# Patient Record
Sex: FEMALE | URBAN - METROPOLITAN AREA
[De-identification: names, ages, dates, MRNs, and addresses within clinical notes are randomized per-mention and may not be internally consistent; named-entity substitution may affect disease eponyms.]

---

## 2024-06-08 ENCOUNTER — INPATIENT (INPATIENT)
Facility: HOSPITAL | Age: 79
LOS: 4 days | Discharge: PSYCHIATRIC OR SUBSTANCE USE FACILITY | DRG: 312 | End: 2024-06-13
Attending: INTERNAL MEDICINE | Admitting: STUDENT IN AN ORGANIZED HEALTH CARE EDUCATION/TRAINING PROGRAM
Payer: MEDICARE

## 2024-06-08 VITALS
RESPIRATION RATE: 20 BRPM | HEART RATE: 82 BPM | DIASTOLIC BLOOD PRESSURE: 75 MMHG | OXYGEN SATURATION: 98 % | SYSTOLIC BLOOD PRESSURE: 124 MMHG | HEIGHT: 70 IN | WEIGHT: 186.07 LBS | TEMPERATURE: 98 F

## 2024-06-08 DIAGNOSIS — R55 SYNCOPE AND COLLAPSE: ICD-10-CM

## 2024-06-08 LAB
ALBUMIN SERPL ELPH-MCNC: 3.1 G/DL — LOW (ref 3.3–5.2)
ALP SERPL-CCNC: 48 U/L — SIGNIFICANT CHANGE UP (ref 40–120)
ALT FLD-CCNC: 6 U/L — SIGNIFICANT CHANGE UP
ANION GAP SERPL CALC-SCNC: 10 MMOL/L — SIGNIFICANT CHANGE UP (ref 5–17)
AST SERPL-CCNC: 15 U/L — SIGNIFICANT CHANGE UP
BASOPHILS # BLD AUTO: 0.03 K/UL — SIGNIFICANT CHANGE UP (ref 0–0.2)
BASOPHILS NFR BLD AUTO: 0.3 % — SIGNIFICANT CHANGE UP (ref 0–2)
BILIRUB SERPL-MCNC: 0.4 MG/DL — SIGNIFICANT CHANGE UP (ref 0.4–2)
BUN SERPL-MCNC: 54.7 MG/DL — HIGH (ref 8–20)
CALCIUM SERPL-MCNC: 8.7 MG/DL — SIGNIFICANT CHANGE UP (ref 8.4–10.5)
CHLORIDE SERPL-SCNC: 104 MMOL/L — SIGNIFICANT CHANGE UP (ref 96–108)
CO2 SERPL-SCNC: 24 MMOL/L — SIGNIFICANT CHANGE UP (ref 22–29)
CREAT SERPL-MCNC: 0.75 MG/DL — SIGNIFICANT CHANGE UP (ref 0.5–1.3)
EGFR: 82 ML/MIN/1.73M2 — SIGNIFICANT CHANGE UP
EOSINOPHIL # BLD AUTO: 0.01 K/UL — SIGNIFICANT CHANGE UP (ref 0–0.5)
EOSINOPHIL NFR BLD AUTO: 0.1 % — SIGNIFICANT CHANGE UP (ref 0–6)
GLUCOSE SERPL-MCNC: 110 MG/DL — HIGH (ref 70–99)
HCT VFR BLD CALC: 23.2 % — LOW (ref 34.5–45)
HCT VFR BLD CALC: 24.7 % — LOW (ref 34.5–45)
HGB BLD-MCNC: 7.9 G/DL — LOW (ref 11.5–15.5)
HGB BLD-MCNC: 8.2 G/DL — LOW (ref 11.5–15.5)
IMM GRANULOCYTES NFR BLD AUTO: 0.6 % — SIGNIFICANT CHANGE UP (ref 0–0.9)
LYMPHOCYTES # BLD AUTO: 0.78 K/UL — LOW (ref 1–3.3)
LYMPHOCYTES # BLD AUTO: 6.5 % — LOW (ref 13–44)
MCHC RBC-ENTMCNC: 31.5 PG — SIGNIFICANT CHANGE UP (ref 27–34)
MCHC RBC-ENTMCNC: 32 PG — SIGNIFICANT CHANGE UP (ref 27–34)
MCHC RBC-ENTMCNC: 33.2 GM/DL — SIGNIFICANT CHANGE UP (ref 32–36)
MCHC RBC-ENTMCNC: 34.1 GM/DL — SIGNIFICANT CHANGE UP (ref 32–36)
MCV RBC AUTO: 93.9 FL — SIGNIFICANT CHANGE UP (ref 80–100)
MCV RBC AUTO: 95 FL — SIGNIFICANT CHANGE UP (ref 80–100)
MONOCYTES # BLD AUTO: 0.94 K/UL — HIGH (ref 0–0.9)
MONOCYTES NFR BLD AUTO: 7.9 % — SIGNIFICANT CHANGE UP (ref 2–14)
NEUTROPHILS # BLD AUTO: 10.12 K/UL — HIGH (ref 1.8–7.4)
NEUTROPHILS NFR BLD AUTO: 84.6 % — HIGH (ref 43–77)
PLATELET # BLD AUTO: 232 K/UL — SIGNIFICANT CHANGE UP (ref 150–400)
PLATELET # BLD AUTO: 239 K/UL — SIGNIFICANT CHANGE UP (ref 150–400)
POTASSIUM SERPL-MCNC: 4 MMOL/L — SIGNIFICANT CHANGE UP (ref 3.5–5.3)
POTASSIUM SERPL-SCNC: 4 MMOL/L — SIGNIFICANT CHANGE UP (ref 3.5–5.3)
PROT SERPL-MCNC: 5.4 G/DL — LOW (ref 6.6–8.7)
RBC # BLD: 2.47 M/UL — LOW (ref 3.8–5.2)
RBC # BLD: 2.6 M/UL — LOW (ref 3.8–5.2)
RBC # FLD: 14 % — SIGNIFICANT CHANGE UP (ref 10.3–14.5)
RBC # FLD: 14.1 % — SIGNIFICANT CHANGE UP (ref 10.3–14.5)
SODIUM SERPL-SCNC: 138 MMOL/L — SIGNIFICANT CHANGE UP (ref 135–145)
TROPONIN T, HIGH SENSITIVITY RESULT: 10 NG/L — SIGNIFICANT CHANGE UP (ref 0–51)
TROPONIN T, HIGH SENSITIVITY RESULT: 11 NG/L — SIGNIFICANT CHANGE UP (ref 0–51)
WBC # BLD: 11.95 K/UL — HIGH (ref 3.8–10.5)
WBC # BLD: 8.81 K/UL — SIGNIFICANT CHANGE UP (ref 3.8–10.5)
WBC # FLD AUTO: 11.95 K/UL — HIGH (ref 3.8–10.5)
WBC # FLD AUTO: 8.81 K/UL — SIGNIFICANT CHANGE UP (ref 3.8–10.5)

## 2024-06-08 PROCEDURE — 70450 CT HEAD/BRAIN W/O DYE: CPT | Mod: 26,MC

## 2024-06-08 PROCEDURE — 72125 CT NECK SPINE W/O DYE: CPT | Mod: 26,MC

## 2024-06-08 PROCEDURE — 71045 X-RAY EXAM CHEST 1 VIEW: CPT | Mod: 26

## 2024-06-08 PROCEDURE — 93010 ELECTROCARDIOGRAM REPORT: CPT

## 2024-06-08 PROCEDURE — 99223 1ST HOSP IP/OBS HIGH 75: CPT | Mod: FS

## 2024-06-08 RX ORDER — APIXABAN 2.5 MG/1
5 TABLET, FILM COATED ORAL
Refills: 0 | Status: DISCONTINUED | OUTPATIENT
Start: 2024-06-08 | End: 2024-06-09

## 2024-06-08 RX ORDER — SODIUM CHLORIDE 9 MG/ML
1000 INJECTION INTRAMUSCULAR; INTRAVENOUS; SUBCUTANEOUS ONCE
Refills: 0 | Status: COMPLETED | OUTPATIENT
Start: 2024-06-08 | End: 2024-06-08

## 2024-06-08 RX ADMIN — SODIUM CHLORIDE 1000 MILLILITER(S): 9 INJECTION INTRAMUSCULAR; INTRAVENOUS; SUBCUTANEOUS at 19:30

## 2024-06-08 RX ADMIN — APIXABAN 5 MILLIGRAM(S): 2.5 TABLET, FILM COATED ORAL at 17:40

## 2024-06-08 NOTE — CONSULT NOTE ADULT - PROBLEM SELECTOR RECOMMENDATION 9
- pt w/ near syncopal episode x 2, once two months ago, once yesterday evening. This AM had mechanical fall without LOC   - Cardiology consulted to eval etiology of near syncope   - pt lives at [facility]   - she has neutrophilic leukocytosis concerning for infection   - BUN is also very high 54.7 with normal creatinine, ratio is concerning for dehydration   - pt is also anemic, unknown baseline. consider r/o GIB. pt is on eliquis. check occult blood and hold eliquis if bleeding/consult GI   - trop is negative x 2   - EKG is non ischemic   - would r/o underlying infection consider UTI/Viral illness as part of differential   - would give IVF hydration, trend CBC and consider blood transfusion if necessary  - check Echo and monitor on telemetry   - check orthostatic   - Patient's presentation does not appear to be of primary cardiac etiology, we will follow but she needs infectious disease / GIB workup. Strongly recommend hospitalist evaluation. strongly recommend workup to rule out other etiologies and explain leukocytosis/anemia/marked elevated BUN - pt w/ near syncopal episode x 2, once two months ago, once yesterday evening. This AM had mechanical fall without LOC   - Cardiology consulted to eval etiology of near syncope   - pt lives at [facility]   - she has neutrophilic leukocytosis concerning for infection   - BUN is also very high 54.7 with normal creatinine, ratio is concerning for dehydration   - pt is also anemic, unknown baseline. consider r/o GIB. pt is on eliquis. check occult blood and hold eliquis if bleeding/consult GI   - trop is negative x 2   - EKG shows lateral TWI consistent w/ lateral ischemia. no EKG for comparison. pt categorically denies angina/anginal equivalents. No ST elevation or depression.   - would r/o underlying infection consider UTI/Viral illness as part of differential   - would give IVF hydration, trend CBC and consider blood transfusion if necessary  - check Echo and monitor on telemetry   - check orthostatic   - Patient's presentation does not appear to be of primary cardiac etiology, we will follow but she needs infectious disease / GIB workup. Strongly recommend hospitalist evaluation. strongly recommend workup to rule out other etiologies and explain leukocytosis/anemia/marked elevated BUN

## 2024-06-08 NOTE — ED PROVIDER NOTE - CLINICAL SUMMARY MEDICAL DECISION MAKING FREE TEXT BOX
78 year old female on eliquis s/p near syncopal fall. On examination patient is alert and oriented. Answering questions appropriately. HD stable. Not complaining of any pain. Examination benign. CT head/neck ordered to assess for any possible ICH given patient's AC use. TTE and labs ordered given recent multiple fall/near syncopal episodes to assess for possible cardiac/metabolic etiology.

## 2024-06-08 NOTE — ED PROVIDER NOTE - PROGRESS NOTE DETAILS
Patient to be placed in obs pending an echo in the AM as well as cardiology assessment. Spoke with patient regarding need to stay, patient understands and in agreement with plan.   Concettar Jacque Escobedo MD

## 2024-06-08 NOTE — ED CDU PROVIDER INITIAL DAY NOTE - OBJECTIVE STATEMENT
79yo F PMHx PE on eliquis BIBEMS from [facility] s/p syncope. Patient states she experienced an episode of lightheadedness yesterday while sitting that quickly self resolved. However today, felt lightheaded upon standing while getting out of the bathroom, felt unsteady and fell backwards. Denies LOC. Able to ambulate shortly afterwards without assistance. Reports she may have had similar episode 1 week ago, otherwise no hx of syncope previously. Does not follow with cardiology. ED workup reviewed, hemoglobin 8.2 without previous labs for comparison. Pt denies any known hx anemia. Labs otherwise unremarkable, trop stable (10, 11). CT head and c-spine, CXR negative. Pt feeling well at this time, no complaints currently. Placed in obs for TTE and cardiology c/s. Denies headache, cp, sob, n/v/d, melena, urinary sx, neck pain, back pain, numbness, tingling, weakness.

## 2024-06-08 NOTE — ED CDU PROVIDER INITIAL DAY NOTE - CLINICAL SUMMARY MEDICAL DECISION MAKING FREE TEXT BOX
79yo F PMHx PE on eliquis BIBEMS from [facility] s/p syncope. near syncope yesterday and syncope upon standing today. neurologically intact. Troponin stable. Anemic Hgb 8.2 without previous for comparison. hemodynamically stable. will repeat cbc. Obs for TTE and [facility] c/s, tele monitoring

## 2024-06-08 NOTE — ED ADULT NURSE REASSESSMENT NOTE - NSFALLHARMRISKINTERV_ED_ALL_ED
Assistance OOB with selected safe patient handling equipment if applicable/Assistance with ambulation/Communicate risk of Fall with Harm to all staff, patient, and family/Monitor gait and stability/Monitor for mental status changes and reorient to person, place, and time, as needed/Move patient closer to nursing station/within visual sight of ED staff/Provide visual cue: red socks, yellow wristband, yellow gown, etc/Reinforce activity limits and safety measures with patient and family/Toileting schedule using arm’s reach rule for commode and bathroom/Use of alarms - bed, stretcher, chair and/or video monitoring/Bed in lowest position, wheels locked, appropriate side rails in place/Call bell, personal items and telephone in reach/Instruct patient to call for assistance before getting out of bed/chair/stretcher/Non-slip footwear applied when patient is off stretcher/[place] to call system/Physically safe environment - no spills, clutter or unnecessary equipment/Purposeful Proactive Rounding/Room/bathroom lighting operational, light cord in reach

## 2024-06-08 NOTE — ED PROVIDER NOTE - OBJECTIVE STATEMENT
78 year old female w/PMHx PE on eliquis B[facility]EMS [facility]/p fall. Patient states she experienced an episode of lightheadedness yesterday while sitting that quickly self resolved. However today, felt lightheaded while getting out of the bathroom, felt unsteady and fell backwards. Denies LOC. Able to ambulate shortly afterwards without assistance. Notes lightheadedness episode was associated with dizziness but denies any chest pain, neck pain, SOB, vision changes, headache, numbness or tingling. Denies any recent fevers, chills, nausea, vomiting or abd pain. No recent medication changes.

## 2024-06-08 NOTE — ED ADULT NURSE NOTE - OBJECTIVE STATEMENT
Pt A+ox4 states that she got dizzy when she stood up and fell .Pt states that she is unsure if she hit her head. Pt c/o leg pain at this time, pt states it feels like a cramp. Pt denies CP, SOB, ABD pain, HA, N/V/D. Pupils are round, equal and reactive to light. Pt connected to telebox at this time. [facility] Pt left in position of comfort, wheels of stretcher locked and in the lowest position.

## 2024-06-08 NOTE — ED ADULT NURSE NOTE - NSFALLRISKFACTORS_ED_ALL_ED
Coagulation: Bleeding disorder either through use of anticoagulants or underlying clinical condition(s) Cyclophosphamide Counseling:  I discussed with the patient the risks of cyclophosphamide including but not limited to hair loss, hormonal abnormalities, decreased fertility, abdominal pain, diarrhea, nausea and vomiting, bone marrow suppression and infection. The patient understands that monitoring is required while taking this medication.

## 2024-06-08 NOTE — ED ADULT NURSE NOTE - NSFALLHARMRISKINTERV_ED_ALL_ED
Assistance OOB with selected safe patient handling equipment if applicable/Assistance with ambulation/Communicate risk of Fall with Harm to all staff, patient, and family/Encourage patient to sit up slowly, dangle for a short time, stand at bedside before walking/Monitor gait and stability/Orthostatic vital signs/Provide visual cue: red socks, yellow wristband, yellow gown, etc/Reinforce activity limits and safety measures with patient and family/Bed in lowest position, wheels locked, appropriate side rails in place/Call bell, personal items and telephone in reach/Instruct patient to call for assistance before getting out of bed/chair/stretcher/Non-slip footwear applied when patient is off stretcher/[place] to call system/Physically safe environment - no spills, clutter or unnecessary equipment/Purposeful Proactive Rounding/Room/bathroom lighting operational, light cord in reach

## 2024-06-08 NOTE — ED PROVIDER NOTE - INTERPRETATION
Dr. Stanley: Independent review of EKG shows NSR with isolated T wave inversions in lead I and aVL without reciprocal changes, no STEMI, 88 bpm, , , QTc 471.

## 2024-06-08 NOTE — ED ADULT TRIAGE NOTE - CHIEF COMPLAINT QUOTE
" I got up out of bed and felt lightheaded and fell" pt unsure if she hit her head, denies LOC. pt on blood thinners, unsure of the name of medication. pt co pain to lower legs. pt from [facility] with [facility] at bedside.

## 2024-06-08 NOTE — ED PROVIDER NOTE - ATTENDING CONTRIBUTION TO CARE
I have personally performed a history and physical examination of the patient and discussed management with the resident as well as the patient.  I reviewed the resident's note and agree with the documented findings and plan of care.  I have authored and modified critical sections of the Provider Note, including but not limited to HPI, Physical Exam and MDM.    78 year old female w/PMHx PE on eliquis B[facility]EMS [facility] s/p fall. Patient states she experienced an episode of lightheadedness yesterday while sitting that quickly self resolved. However today, felt lightheaded while getting out of the bathroom, felt unsteady and fell backwards.  Patient not tachycardic and otherwise hemodynamically stable.  A&O X3, GCS 15 on examination.  Asymptomatic at this time.  Low suspicion ACS.  Will obtain CBC, CMP, EKG.  Consider CT PE however lower likelihood of diagnostic yield.  In the setting of syncope will obtain TTE.  Obtain CT head and CT C-spine for low suspicion fracture versus TBI.  Disposition pending results.

## 2024-06-08 NOTE — ED CDU PROVIDER INITIAL DAY NOTE - ATTENDING APP SHARED VISIT CONTRIBUTION OF CARE
I, Stanley Sanchez MD, performed the initial face to face bedside interview with this patient regarding history of present illness, review of symptoms and relevant past medical, social and family history.  I completed an independent physical examination.  I was the initial provider who evaluated this patient. I have signed out the follow up of any pending tests (i.e. labs, radiological studies) to the ACP.  I have communicated the patient’s plan of care and disposition with the ACP.

## 2024-06-09 ENCOUNTER — RESULT REVIEW (OUTPATIENT)
Age: 79
End: 2024-06-09

## 2024-06-09 DIAGNOSIS — R55 SYNCOPE AND COLLAPSE: ICD-10-CM

## 2024-06-09 LAB
ABO RH CONFIRMATION: SIGNIFICANT CHANGE UP
ALBUMIN SERPL ELPH-MCNC: 3 G/DL — LOW (ref 3.3–5.2)
ALBUMIN SERPL ELPH-MCNC: 3 G/DL — LOW (ref 3.3–5.2)
ALP SERPL-CCNC: 47 U/L — SIGNIFICANT CHANGE UP (ref 40–120)
ALP SERPL-CCNC: 50 U/L — SIGNIFICANT CHANGE UP (ref 40–120)
ALT FLD-CCNC: 7 U/L — SIGNIFICANT CHANGE UP
ALT FLD-CCNC: 9 U/L — SIGNIFICANT CHANGE UP
ANION GAP SERPL CALC-SCNC: 10 MMOL/L — SIGNIFICANT CHANGE UP (ref 5–17)
APTT BLD: 31.6 SEC — SIGNIFICANT CHANGE UP (ref 24.5–35.6)
AST SERPL-CCNC: 29 U/L — SIGNIFICANT CHANGE UP
AST SERPL-CCNC: 33 U/L — HIGH
BASOPHILS # BLD AUTO: 0.04 K/UL — SIGNIFICANT CHANGE UP (ref 0–0.2)
BASOPHILS # BLD AUTO: 0.06 K/UL — SIGNIFICANT CHANGE UP (ref 0–0.2)
BASOPHILS NFR BLD AUTO: 0.6 % — SIGNIFICANT CHANGE UP (ref 0–2)
BASOPHILS NFR BLD AUTO: 0.8 % — SIGNIFICANT CHANGE UP (ref 0–2)
BILIRUB DIRECT SERPL-MCNC: 0.2 MG/DL — SIGNIFICANT CHANGE UP (ref 0–0.3)
BILIRUB INDIRECT FLD-MCNC: 0.2 MG/DL — SIGNIFICANT CHANGE UP (ref 0.2–1)
BILIRUB SERPL-MCNC: 0.4 MG/DL — SIGNIFICANT CHANGE UP (ref 0.4–2)
BILIRUB SERPL-MCNC: 0.5 MG/DL — SIGNIFICANT CHANGE UP (ref 0.4–2)
BLD GP AB SCN SERPL QL: SIGNIFICANT CHANGE UP
BUN SERPL-MCNC: 43 MG/DL — HIGH (ref 8–20)
CALCIUM SERPL-MCNC: 8.6 MG/DL — SIGNIFICANT CHANGE UP (ref 8.4–10.5)
CHLORIDE SERPL-SCNC: 107 MMOL/L — SIGNIFICANT CHANGE UP (ref 96–108)
CO2 SERPL-SCNC: 22 MMOL/L — SIGNIFICANT CHANGE UP (ref 22–29)
CREAT SERPL-MCNC: 0.8 MG/DL — SIGNIFICANT CHANGE UP (ref 0.5–1.3)
EGFR: 76 ML/MIN/1.73M2 — SIGNIFICANT CHANGE UP
EOSINOPHIL # BLD AUTO: 0.04 K/UL — SIGNIFICANT CHANGE UP (ref 0–0.5)
EOSINOPHIL # BLD AUTO: 0.14 K/UL — SIGNIFICANT CHANGE UP (ref 0–0.5)
EOSINOPHIL NFR BLD AUTO: 0.5 % — SIGNIFICANT CHANGE UP (ref 0–6)
EOSINOPHIL NFR BLD AUTO: 2.1 % — SIGNIFICANT CHANGE UP (ref 0–6)
GLUCOSE SERPL-MCNC: 100 MG/DL — HIGH (ref 70–99)
HCT VFR BLD CALC: 20.2 % — CRITICAL LOW (ref 34.5–45)
HCT VFR BLD CALC: 20.2 % — CRITICAL LOW (ref 34.5–45)
HCT VFR BLD CALC: 21.4 % — LOW (ref 34.5–45)
HGB BLD-MCNC: 6.9 G/DL — CRITICAL LOW (ref 11.5–15.5)
HGB BLD-MCNC: 6.9 G/DL — CRITICAL LOW (ref 11.5–15.5)
HGB BLD-MCNC: 7.2 G/DL — LOW (ref 11.5–15.5)
IMM GRANULOCYTES NFR BLD AUTO: 0.4 % — SIGNIFICANT CHANGE UP (ref 0–0.9)
IMM GRANULOCYTES NFR BLD AUTO: 0.4 % — SIGNIFICANT CHANGE UP (ref 0–0.9)
INR BLD: 1.72 RATIO — HIGH (ref 0.85–1.18)
LYMPHOCYTES # BLD AUTO: 1.4 K/UL — SIGNIFICANT CHANGE UP (ref 1–3.3)
LYMPHOCYTES # BLD AUTO: 1.56 K/UL — SIGNIFICANT CHANGE UP (ref 1–3.3)
LYMPHOCYTES # BLD AUTO: 18.9 % — SIGNIFICANT CHANGE UP (ref 13–44)
LYMPHOCYTES # BLD AUTO: 23.4 % — SIGNIFICANT CHANGE UP (ref 13–44)
MANUAL SMEAR VERIFICATION: SIGNIFICANT CHANGE UP
MCHC RBC-ENTMCNC: 31.4 PG — SIGNIFICANT CHANGE UP (ref 27–34)
MCHC RBC-ENTMCNC: 32.2 PG — SIGNIFICANT CHANGE UP (ref 27–34)
MCHC RBC-ENTMCNC: 32.2 PG — SIGNIFICANT CHANGE UP (ref 27–34)
MCHC RBC-ENTMCNC: 33.6 GM/DL — SIGNIFICANT CHANGE UP (ref 32–36)
MCHC RBC-ENTMCNC: 34.2 GM/DL — SIGNIFICANT CHANGE UP (ref 32–36)
MCHC RBC-ENTMCNC: 34.2 GM/DL — SIGNIFICANT CHANGE UP (ref 32–36)
MCV RBC AUTO: 93.4 FL — SIGNIFICANT CHANGE UP (ref 80–100)
MCV RBC AUTO: 94.4 FL — SIGNIFICANT CHANGE UP (ref 80–100)
MCV RBC AUTO: 94.4 FL — SIGNIFICANT CHANGE UP (ref 80–100)
MONOCYTES # BLD AUTO: 0.57 K/UL — SIGNIFICANT CHANGE UP (ref 0–0.9)
MONOCYTES # BLD AUTO: 0.57 K/UL — SIGNIFICANT CHANGE UP (ref 0–0.9)
MONOCYTES NFR BLD AUTO: 7.7 % — SIGNIFICANT CHANGE UP (ref 2–14)
MONOCYTES NFR BLD AUTO: 8.5 % — SIGNIFICANT CHANGE UP (ref 2–14)
NEUTROPHILS # BLD AUTO: 4.34 K/UL — SIGNIFICANT CHANGE UP (ref 1.8–7.4)
NEUTROPHILS # BLD AUTO: 5.3 K/UL — SIGNIFICANT CHANGE UP (ref 1.8–7.4)
NEUTROPHILS NFR BLD AUTO: 65 % — SIGNIFICANT CHANGE UP (ref 43–77)
NEUTROPHILS NFR BLD AUTO: 71.7 % — SIGNIFICANT CHANGE UP (ref 43–77)
OB PNL STL: POSITIVE
PLAT MORPH BLD: NORMAL — SIGNIFICANT CHANGE UP
PLATELET # BLD AUTO: 225 K/UL — SIGNIFICANT CHANGE UP (ref 150–400)
PLATELET # BLD AUTO: 225 K/UL — SIGNIFICANT CHANGE UP (ref 150–400)
PLATELET # BLD AUTO: 231 K/UL — SIGNIFICANT CHANGE UP (ref 150–400)
POTASSIUM SERPL-MCNC: 4.1 MMOL/L — SIGNIFICANT CHANGE UP (ref 3.5–5.3)
POTASSIUM SERPL-SCNC: 4.1 MMOL/L — SIGNIFICANT CHANGE UP (ref 3.5–5.3)
PROT SERPL-MCNC: 5 G/DL — LOW (ref 6.6–8.7)
PROT SERPL-MCNC: 5.3 G/DL — LOW (ref 6.6–8.7)
PROTHROM AB SERPL-ACNC: 18.8 SEC — HIGH (ref 9.5–13)
RBC # BLD: 2.14 M/UL — LOW (ref 3.8–5.2)
RBC # BLD: 2.14 M/UL — LOW (ref 3.8–5.2)
RBC # BLD: 2.29 M/UL — LOW (ref 3.8–5.2)
RBC # FLD: 14.3 % — SIGNIFICANT CHANGE UP (ref 10.3–14.5)
RBC # FLD: 14.3 % — SIGNIFICANT CHANGE UP (ref 10.3–14.5)
RBC # FLD: 14.4 % — SIGNIFICANT CHANGE UP (ref 10.3–14.5)
RBC BLD AUTO: NORMAL — SIGNIFICANT CHANGE UP
SODIUM SERPL-SCNC: 139 MMOL/L — SIGNIFICANT CHANGE UP (ref 135–145)
WBC # BLD: 6.68 K/UL — SIGNIFICANT CHANGE UP (ref 3.8–10.5)
WBC # BLD: 6.68 K/UL — SIGNIFICANT CHANGE UP (ref 3.8–10.5)
WBC # BLD: 7.4 K/UL — SIGNIFICANT CHANGE UP (ref 3.8–10.5)
WBC # FLD AUTO: 6.68 K/UL — SIGNIFICANT CHANGE UP (ref 3.8–10.5)
WBC # FLD AUTO: 6.68 K/UL — SIGNIFICANT CHANGE UP (ref 3.8–10.5)
WBC # FLD AUTO: 7.4 K/UL — SIGNIFICANT CHANGE UP (ref 3.8–10.5)

## 2024-06-09 PROCEDURE — 99223 1ST HOSP IP/OBS HIGH 75: CPT

## 2024-06-09 PROCEDURE — 99233 SBSQ HOSP IP/OBS HIGH 50: CPT | Mod: FS

## 2024-06-09 PROCEDURE — 99232 SBSQ HOSP IP/OBS MODERATE 35: CPT

## 2024-06-09 PROCEDURE — 93306 TTE W/DOPPLER COMPLETE: CPT | Mod: 26

## 2024-06-09 RX ORDER — ACETAMINOPHEN 500 MG
650 TABLET ORAL EVERY 6 HOURS
Refills: 0 | Status: DISCONTINUED | OUTPATIENT
Start: 2024-06-09 | End: 2024-06-13

## 2024-06-09 RX ORDER — ARIPIPRAZOLE 15 MG/1
400 TABLET ORAL
Refills: 0 | DISCHARGE

## 2024-06-09 RX ORDER — LANOLIN ALCOHOL/MO/W.PET/CERES
3 CREAM (GRAM) TOPICAL AT BEDTIME
Refills: 0 | Status: DISCONTINUED | OUTPATIENT
Start: 2024-06-09 | End: 2024-06-13

## 2024-06-09 RX ORDER — NYSTATIN CREAM 100000 [USP'U]/G
1 CREAM TOPICAL
Refills: 0 | DISCHARGE

## 2024-06-09 RX ORDER — NYSTATIN CREAM 100000 [USP'U]/G
1 CREAM TOPICAL
Refills: 0 | Status: DISCONTINUED | OUTPATIENT
Start: 2024-06-09 | End: 2024-06-13

## 2024-06-09 RX ORDER — PANTOPRAZOLE SODIUM 20 MG/1
80 TABLET, DELAYED RELEASE ORAL ONCE
Refills: 0 | Status: DISCONTINUED | OUTPATIENT
Start: 2024-06-09 | End: 2024-06-09

## 2024-06-09 RX ORDER — PANTOPRAZOLE SODIUM 20 MG/1
40 TABLET, DELAYED RELEASE ORAL
Refills: 0 | Status: DISCONTINUED | OUTPATIENT
Start: 2024-06-09 | End: 2024-06-13

## 2024-06-09 RX ORDER — CHOLECALCIFEROL (VITAMIN D3) 125 MCG
1 CAPSULE ORAL
Refills: 0 | DISCHARGE

## 2024-06-09 RX ORDER — ONDANSETRON 8 MG/1
4 TABLET, FILM COATED ORAL EVERY 8 HOURS
Refills: 0 | Status: DISCONTINUED | OUTPATIENT
Start: 2024-06-09 | End: 2024-06-13

## 2024-06-09 RX ORDER — CHOLECALCIFEROL (VITAMIN D3) 125 MCG
1250 CAPSULE ORAL DAILY
Refills: 0 | Status: DISCONTINUED | OUTPATIENT
Start: 2024-06-09 | End: 2024-06-13

## 2024-06-09 RX ADMIN — APIXABAN 5 MILLIGRAM(S): 2.5 TABLET, FILM COATED ORAL at 06:22

## 2024-06-09 RX ADMIN — PANTOPRAZOLE SODIUM 40 MILLIGRAM(S): 20 TABLET, DELAYED RELEASE ORAL at 17:33

## 2024-06-09 RX ADMIN — Medication 1250 UNIT(S): at 17:33

## 2024-06-09 RX ADMIN — NYSTATIN CREAM 1 APPLICATION(S): 100000 CREAM TOPICAL at 17:34

## 2024-06-09 NOTE — H&P ADULT - NSHPREVIEWOFSYSTEMS_GEN_ALL_CORE
REVIEW OF SYSTEMS:    CONSTITUTIONAL: No weakness, fevers or chills  EYES: No vertigo or throat pain  ENT: No visual changes, eye pain  MOUTH: moist collette mucosal, no mouth ulcers  NECK: No pain or stiffness  RESPIRATORY: No cough, wheezing, hemoptysis; No shortness of breath  CARDIOVASCULAR: No chest pain or palpitations  GASTROINTESTINAL: No abdominal or epigastric pain. No nausea, vomiting, or hematemesis; No diarrhea or constipation. No melena or hematochezia.  GENITOURINARY: No dysuria, frequency or hematuria  NEUROLOGICAL: +Dizziness, fall  SKIN: No itching, rashes  PSYCH: No anxiety or depression REVIEW OF SYSTEMS:    CONSTITUTIONAL: No weakness, fevers or chills  EYES: No vertigo or throat pain  ENT: No visual changes, eye pain  MOUTH: moist collette mucosal, no mouth ulcers  NECK: No pain or stiffness  RESPIRATORY: No cough, wheezing, hemoptysis; No shortness of breath  CARDIOVASCULAR: No chest pain or palpitations  GASTROINTESTINAL: No abdominal or epigastric pain. No nausea, vomiting, or hematemesis; No diarrhea or constipation. Does not know the color of her stools, denies bright red blood  GENITOURINARY: No dysuria, frequency or hematuria  NEUROLOGICAL: +Dizziness, fall  SKIN: No itching, rashes  PSYCH: No anxiety or depression

## 2024-06-09 NOTE — CHART NOTE - NSCHARTNOTEFT_GEN_A_CORE
Notified by RN that patient's IV infiltrated while receiving blood transfusion.  RUE swollen, mildly tender, soft, warm. + radial pulse, <2 sec cap refill.  -Elevate extremity  -Neurovascular checks Q4  -warm compress Notified by RN that patient's IV infiltrated while receiving blood transfusion.  RUE swollen, mildly tender, soft, warm. + radial pulse, <2 sec cap refill.  Also notified that patient had +orthostatics.   -Elevate extremity  -Neurovascular checks Q4  -warm compress Notified by RN that patient's IV infiltrated while receiving blood transfusion.  RUE swollen, mildly tender, soft, warm. + radial pulse, <2 sec cap refill.  Also notified that patient had +orthostatics.     -Elevate extremity  -Neurovascular checks Q4  -warm compress  -Fall precautions, ambulate with assistance

## 2024-06-09 NOTE — H&P ADULT - ASSESSMENT
78 year old female w/PMHx PE on eliquis, schizophrenia p/w dizziness and near syncopal episodes, also found to have acute worsening anemia, suspct GIB.     #Dizziness, near-syncope  cardiac vs. 2/2 anemia 2/2 GIB  CTH/cervical without acute findings  EKG without acute ST changes, trop neg x2  cards and GI recs sean  TTE pending  monitor on tele  orthostatic pending  cards following, sean recs    #Anemia suspect 2/2 GIB  with elevated BUN  unclear baseline  Hg 7.9 --> 7.2  f/u PM CBC, transfuse for Hg < 7  active T&S  PPI BID  stop Eliquis  GI eval pending     #PE  was on eliquis, now on hold  vital stable on RA  resume AC when cleared by GI    #h/o schizophrenia   Currently admitted to inpatient [facility]   On IM 400mg Aripiprazole ever 28 days, next dose due 6/20/24  Per ER psych unit, inpatient consult cannot be placed over the weekend, pls call psych in AM for new consult     DVT ppx: SCD given anemia  Diet: Clear liquid for now, pending GI eval  Dispo: tele   78 year old female w/PMHx PE on eliquis, schizophrenia p/w dizziness and near syncopal episodes, also found to have acute worsening anemia, suspct GIB.     #Dizziness, near-syncope  cardiac vs. 2/2 anemia 2/2 GIB  CTH/cervical without acute findings  EKG without acute ST changes, trop neg x2  cards and GI recs sean  TTE pending  monitor on tele  orthostatic pending  cards following, sean recs    #Anemia suspect 2/2 GIB  with elevated BUN  unclear baseline  Hg 7.9 --> 7.2  f/u PM CBC, transfuse for Hg < 7  active T&S  PPI BID  stop Eliquis  GI eval pending     #PE  was on eliquis, now on hold -- per pt, dx with PE at [place] 1 year ago, no prior h/o clotting disorder  vital stable on RA    #h/o schizophrenia   Currently admitted to inpatient [facility]   On IM 400mg Aripiprazole ever 28 days, next dose due 6/20/24  Per ER psych unit, inpatient consult cannot be placed over the weekend, pls call psych in AM for new consult     DVT ppx: SCD given anemia  Diet: Clear liquid for now, pending GI eval  Dispo: tele

## 2024-06-09 NOTE — H&P ADULT - VTE RISK ASSESSMENT
VTE Assessment already completed for this visit Birth Control Pills Pregnancy And Lactation Text: This medication should be avoided if pregnant and for the first 30 days post-partum.

## 2024-06-09 NOTE — PATIENT PROFILE ADULT - FALL HARM RISK - HARM RISK INTERVENTIONS
Assistance with ambulation/Assistance OOB with selected safe patient handling equipment/Communicate Risk of Fall with Harm to all staff/Monitor gait and stability/Reinforce activity limits and safety measures with patient and family/Sit up slowly, dangle for a short time, stand at bedside before walking/Tailored Fall Risk Interventions/Visual Cue: Yellow wristband and red socks/Bed in lowest position, wheels locked, appropriate side rails in place/Call bell, personal items and telephone in reach/Instruct patient to call for assistance before getting out of bed or chair/Non-slip footwear when patient is out of bed/[place] to call system/Physically safe environment - no spills, clutter or unnecessary equipment/Purposeful Proactive Rounding/Room/bathroom lighting operational, light cord in reach

## 2024-06-09 NOTE — ED ADULT NURSE REASSESSMENT NOTE - NS ED NURSE REASSESS COMMENT FT1
Pt A+Ox4, respirations are equal and unlabored on room air. Pt denies pain at this time. pt connected to telebox, aid at bedside. Pt left in position of comfort, wheels of stretcher locked and in the lowest position.
Pt resting in bed, respirations are equal and unlabored at this time. Pt connected to telebox, visitor at bedside.
Received pt from Te[facility] WILLOUGH[facility]. pt sitting quietly in stretcher NAD noted a this time. pt awaiting TTE. NSR on CM. No new orders a this time pt safety ongoing.
plan of care assumed from OZZY WILLOUGHBY @1920. no S&S of acute distress, pt resting comfortably on stretcher. pt denies CP/pressure/tightness, palpitations, SOB/dyspnea, dizziness/headache/lightheadedness/blurry vision, tingling/numbness, fevers/chills, N/V/D, urinary S&S. CM+ maintained. awaiting TTE. updated pt observation orders were placed. plan of care reviewed with pt and family @ bedside. pt in understanding of plan of care.

## 2024-06-09 NOTE — H&P ADULT - HISTORY OF PRESENT ILLNESS
78 year old female w/PMHx PE on eliquis, schizophrenia presnted [facility] for [facility]iness and fall. Patient has been experiencing some lightheadedness when attempted to get out of bathroom, feel unsteady on her feet and fell. No LOC, denies any associated SOB, CP, fever, chill, n/v/c/d nor urinary symptoms.   ED vitals stable, labs notable for progressive anemia Hg down to 7.2, +FOBT, admitted for further care.    78 year old female w/PMHx PE on eliquis, schizophrenia presented from inpatient [facility] for dizziness and fall. Patient has been experiencing some lightheadedness when attempted to get out of bathroom, feel unsteady on her feet and fell. No LOC, denies any associated SOB, CP, fever, chill, n/v/c/d nor urinary symptoms. Denies every having any age appropriate cancer screening.   ED vitals stable, labs notable for progressive anemia Hg down to 7.2, +FOBT, admitted for further care.

## 2024-06-09 NOTE — ED CDU PROVIDER SUBSEQUENT DAY NOTE - HISTORY
Pt resting comfortably at time of re-assessment. No events overnight. Pending TTE, am labs. Will continue to monitor.

## 2024-06-09 NOTE — H&P ADULT - NSHPPHYSICALEXAM_GEN_ALL_CORE
VITALS:   T(C): 36.6 (06-09-24 @ 05:35), Max: 36.9 (06-08-24 @ 19:41)  HR: 79 (06-09-24 @ 05:35) (79 - 97)  BP: 112/69 (06-09-24 @ 05:35) (112/69 - 154/79)  RR: 16 (06-09-24 @ 05:35) (15 - 19)  SpO2: 97% (06-09-24 @ 05:35) (96% - 98%)    GENERAL: NAD, lying in bed comfortably  HEAD:  Atraumatic, Normocephalic  EYES: EOMI, PERRLA, conjunctiva and sclera clear  ENT: Moist mucous membranes  NECK: Supple, No JVD  CHEST/LUNG: Clear to auscultation bilaterally; No rales, rhonchi, wheezing, or rubs. Unlabored respirations  HEART: Regular rate and rhythm; No murmurs, rubs, or gallops  ABDOMEN: BSx4; Soft, nontender, nondistended  EXTREMITIES:  2+ Peripheral Pulses, brisk capillary refill. No clubbing, cyanosis, or edema  NERVOUS SYSTEM:  A&Ox3, no focal deficits   SKIN: No rashes or lesions  PSYCH: Normal affect, euthymic mood

## 2024-06-09 NOTE — PROGRESS NOTE ADULT - ASSESSMENT
78 year old female w/PMHx PE on eliquis BIBEMS from [facility] s/p fall. Found to have GIB. Cardiology will give risk stratification for GI workup.

## 2024-06-09 NOTE — ED CDU PROVIDER SUBSEQUENT DAY NOTE - CLINICAL SUMMARY MEDICAL DECISION MAKING FREE TEXT BOX
79yo F PMHx PE on eliquis BIBEMS from [facility] s/p syncope. near syncope yesterday and syncope upon standing today. neurologically intact. Troponin stable. Anemic Hgb 8.2 without previous for comparison. hemodynamically stable. will repeat cbc. Obs for TTE and [facility] c/s, tele monitoring

## 2024-06-09 NOTE — PATIENT PROFILE ADULT - CENTRAL VENOUS CATHETER/PICC LINE
Case discussed with Dr. Acosta for elevated troponin.   Patient came with symptoms of palpitation diagnosed with SVT, we have lytic maneuvers unsuccessful patient responded to 6 mg of IV Cardizem converted back to normal rhythm   Patient have is SVT in 2018    Patient have mild-to-moderate troponin elevation without chest pain 1st troponin 685 and 2nd troponin slightly trending up.    Plan:   Patient have history of SVT in 2018   Troponin is elevated no with abnormal EKG   Start aspirin 81 mg daily   Start heparin for ACS r/po  Check D-dimer if elevated then consider imaging study week scan CT scan   Please call if any questions or change in clinical status patient.   no

## 2024-06-09 NOTE — CONSULT NOTE ADULT - ASSESSMENT
78-year-old female with a history of PE on Eliquis who presents with near syncope and was found to be anemic    Normocytic anemia  Differential includes malignancy, peptic ulcer disease  Her BUN is elevated  Will plan for EGD tomorrow  NPO after midnight  Please do not send an occult blood this is not a helpful test   Clear liquid diet today  Avoid NSAIDs  Hold Eliquis at this time  Cardiology restratification appreciated
78 year old female w/PMHx PE on eliquis [facility]. Patient states she experienced an episode of lightheadedness yesterday while sitting that quickly self resolved. However today, felt lightheaded while getting out of the bathroom, felt unsteady and fell backwards. Denies LOC. Able to ambulate shortly afterwards without assistance. Notes lightheadedness episode was associated with dizziness but denies any chest pain, neck pain, SOB, vision changes, headache, numbness or tingling. Denies any recent fevers, chills, nausea, vomiting or abd pain. No recent medication changes

## 2024-06-09 NOTE — PROGRESS NOTE ADULT - PROBLEM SELECTOR PLAN 1
- near syncope is secondary to acute anemia blood loss, GIB  - no evidence for cardiac etiologies   - pt is on eliquis for hx of PE, GIB appears secondary to blood thinner use   - needs GI workup  - echo is pending  - if echo is unremarkable the below risk profile will apply. We have very low suspicion for abnormal echo as there is no evidence to suggest echo will be abnormal. there are no murmurs on exam, pt is completely euvolemic and categorically denies all cardiac complaints including palpitations, chest pain, chest pressure, SOB, HOBBS, etc.   - Her near syncope is in the setting of acute blood loss as evidenced by elevated BUN, low hgb, positive occult blood    *Risk profile is contingent upon echo results.  - RCRI Estimated Risk of Adverse Outcome with Non-cardiac Surgery is Low Risk  - Estimated Rate of Myocardial Infarction, Pulmonary Edema, Ventricular Fibrillation, Cardiac Arrest, or Complete Heart Block 0.4 %  - there are no absolute cardiac contraindications     - otherwise no further inpatient cardiology workup or recommendations  - we will sign off - near syncope is secondary to acute anemia blood loss, GIB  - no evidence for cardiac etiologies   - pt is on eliquis for hx of PE, GIB appears secondary to blood thinner use   - needs GI workup  - echo is pending  - if echo is unremarkable the below risk profile will apply. We have very low suspicion for abnormal echo as there is no evidence to suggest echo will be abnormal. there are no murmurs on exam, pt is completely euvolemic and categorically denies all cardiac complaints including palpitations, chest pain, chest pressure, SOB, HOBBS, etc.   - Her near syncope is in the setting of acute blood loss as evidenced by elevated BUN, low hgb, positive occult blood  - Echo reviewed unremarkable, normal EF, mild-mod AI   - RCRI Estimated Risk of Adverse Outcome with Non-cardiac Surgery is Low Risk  - Estimated Rate of Myocardial Infarction, Pulmonary Edema, Ventricular Fibrillation, Cardiac Arrest, or Complete Heart Block 0.4 %  - there are no absolute cardiac contraindications - otherwise no further inpatient cardiology workup or recommendations  - we will sign off

## 2024-06-09 NOTE — PROVIDER CONTACT NOTE (OTHER) - ASSESSMENT
right upper arm IV infiltration noted upon completion of blood. slight swollen noted. Extremity warm to touch, radial pulse present, sensation present, site ecchymotic. IV removed, arm elevated. patient has no complaints.

## 2024-06-09 NOTE — ED CDU PROVIDER SUBSEQUENT DAY NOTE - ATTENDING APP SHARED VISIT CONTRIBUTION OF CARE
indep eval  pt in obs for syncope protocol  hgb trending down and has occult + stool  will need admission for further evaluation  agree w plan

## 2024-06-09 NOTE — ED CDU PROVIDER DISPOSITION NOTE - CLINICAL COURSE
78 year old female w/PMHx PE on eliquis BIBEMS from inpatient [facility]/p fall. Patient states she experienced an episode of lightheadedness while sitting that quickly self resolved. However today, felt lightheaded while getting out of the bathroom, felt unsteady and fell backwards. Pt placed in obs after intial eval was neg, Pt found to have down treading H/H, Ocult pos, discussed with hospitalist, that agrees pt warrants  admission for further evaluation and treatment, pt made aware of need for admission and possible further treatments, pt states that they agree with need for admission and they understand all results and possible treatments. PT will be admitted to hospitalist team and care will be transferred to admitting team.

## 2024-06-09 NOTE — H&P ADULT - NSHPLABSRESULTS_GEN_ALL_CORE
7.2    7.40  )-----------( 231      ( 09 Jun 2024 06:00 )             21.4       06-09    139  |  107  |  43.0<H>  ----------------------------<  100<H>  4.1   |  22.0  |  0.80    Ca    8.6      09 Jun 2024 06:00    TPro  5.3<L>  /  Alb  3.0<L>  /  TBili  0.5  /  DBili  x   /  AST  33<H>  /  ALT  7   /  AlkPhos  50  06-09          Urinalysis Basic - ( 09 Jun 2024 06:00 )    Color: x / Appearance: x / SG: x / pH: x  Gluc: 100 mg/dL / Ketone: x  / Bili: x / Urobili: x   Blood: x / Protein: x / Nitrite: x   Leuk Esterase: x / RBC: x / WBC x   Sq Epi: x / Non Sq Epi: x / Bacteria: x        Lactate Trend      CAPILLARY BLOOD GLUCOSE      POCT Blood Glucose.: 111 mg/dL (08 Jun 2024 07:26)      < from: CT Head No Cont (06.08.24 @ 11:27) >    IMPRESSION:    CT BRAIN: No CT evidence for acute intracranial abnormalities.    CT CERVICAL SPINE: No acute fracture or subluxation.    --- End of Report ---    < end of copied text >

## 2024-06-09 NOTE — H&P ADULT - NSHPPOADEEPVENOUSTHROMB_GEN_A_CORE
Spoke with pt informed forms were filled out and faxed. Pt thankful for call and will  original forms at .    no

## 2024-06-10 ENCOUNTER — TRANSCRIPTION ENCOUNTER (OUTPATIENT)
Age: 79
End: 2024-06-10

## 2024-06-10 LAB
ALBUMIN SERPL ELPH-MCNC: 2.8 G/DL — LOW (ref 3.3–5.2)
ALP SERPL-CCNC: 48 U/L — SIGNIFICANT CHANGE UP (ref 40–120)
ALT FLD-CCNC: 8 U/L — SIGNIFICANT CHANGE UP
ANION GAP SERPL CALC-SCNC: 11 MMOL/L — SIGNIFICANT CHANGE UP (ref 5–17)
AST SERPL-CCNC: 28 U/L — SIGNIFICANT CHANGE UP
BILIRUB SERPL-MCNC: 0.7 MG/DL — SIGNIFICANT CHANGE UP (ref 0.4–2)
BUN SERPL-MCNC: 32.2 MG/DL — HIGH (ref 8–20)
CALCIUM SERPL-MCNC: 8.7 MG/DL — SIGNIFICANT CHANGE UP (ref 8.4–10.5)
CHLORIDE SERPL-SCNC: 106 MMOL/L — SIGNIFICANT CHANGE UP (ref 96–108)
CO2 SERPL-SCNC: 23 MMOL/L — SIGNIFICANT CHANGE UP (ref 22–29)
CREAT SERPL-MCNC: 0.67 MG/DL — SIGNIFICANT CHANGE UP (ref 0.5–1.3)
EGFR: 90 ML/MIN/1.73M2 — SIGNIFICANT CHANGE UP
GLUCOSE SERPL-MCNC: 89 MG/DL — SIGNIFICANT CHANGE UP (ref 70–99)
HCT VFR BLD CALC: 20.1 % — CRITICAL LOW (ref 34.5–45)
HCT VFR BLD CALC: 22.9 % — LOW (ref 34.5–45)
HCT VFR BLD CALC: 24.3 % — LOW (ref 34.5–45)
HGB BLD-MCNC: 6.7 G/DL — CRITICAL LOW (ref 11.5–15.5)
HGB BLD-MCNC: 7.4 G/DL — LOW (ref 11.5–15.5)
HGB BLD-MCNC: 8.1 G/DL — LOW (ref 11.5–15.5)
INR BLD: 1.34 RATIO — HIGH (ref 0.85–1.18)
MAGNESIUM SERPL-MCNC: 1.8 MG/DL — SIGNIFICANT CHANGE UP (ref 1.6–2.6)
MCHC RBC-ENTMCNC: 31.5 PG — SIGNIFICANT CHANGE UP (ref 27–34)
MCHC RBC-ENTMCNC: 32 PG — SIGNIFICANT CHANGE UP (ref 27–34)
MCHC RBC-ENTMCNC: 32.2 PG — SIGNIFICANT CHANGE UP (ref 27–34)
MCHC RBC-ENTMCNC: 32.3 GM/DL — SIGNIFICANT CHANGE UP (ref 32–36)
MCHC RBC-ENTMCNC: 33.3 GM/DL — SIGNIFICANT CHANGE UP (ref 32–36)
MCHC RBC-ENTMCNC: 33.3 GM/DL — SIGNIFICANT CHANGE UP (ref 32–36)
MCV RBC AUTO: 94.6 FL — SIGNIFICANT CHANGE UP (ref 80–100)
MCV RBC AUTO: 96.6 FL — SIGNIFICANT CHANGE UP (ref 80–100)
MCV RBC AUTO: 99.1 FL — SIGNIFICANT CHANGE UP (ref 80–100)
PHOSPHATE SERPL-MCNC: 3.7 MG/DL — SIGNIFICANT CHANGE UP (ref 2.4–4.7)
PLATELET # BLD AUTO: 224 K/UL — SIGNIFICANT CHANGE UP (ref 150–400)
PLATELET # BLD AUTO: 232 K/UL — SIGNIFICANT CHANGE UP (ref 150–400)
PLATELET # BLD AUTO: 232 K/UL — SIGNIFICANT CHANGE UP (ref 150–400)
POTASSIUM SERPL-MCNC: 3.8 MMOL/L — SIGNIFICANT CHANGE UP (ref 3.5–5.3)
POTASSIUM SERPL-SCNC: 3.8 MMOL/L — SIGNIFICANT CHANGE UP (ref 3.5–5.3)
PROT SERPL-MCNC: 4.9 G/DL — LOW (ref 6.6–8.7)
PROTHROM AB SERPL-ACNC: 14.8 SEC — HIGH (ref 9.5–13)
RBC # BLD: 2.08 M/UL — LOW (ref 3.8–5.2)
RBC # BLD: 2.31 M/UL — LOW (ref 3.8–5.2)
RBC # BLD: 2.57 M/UL — LOW (ref 3.8–5.2)
RBC # FLD: 14.6 % — HIGH (ref 10.3–14.5)
RBC # FLD: 14.7 % — HIGH (ref 10.3–14.5)
RBC # FLD: 16.6 % — HIGH (ref 10.3–14.5)
SODIUM SERPL-SCNC: 140 MMOL/L — SIGNIFICANT CHANGE UP (ref 135–145)
WBC # BLD: 5.68 K/UL — SIGNIFICANT CHANGE UP (ref 3.8–10.5)
WBC # BLD: 6.24 K/UL — SIGNIFICANT CHANGE UP (ref 3.8–10.5)
WBC # BLD: 6.5 K/UL — SIGNIFICANT CHANGE UP (ref 3.8–10.5)
WBC # FLD AUTO: 5.68 K/UL — SIGNIFICANT CHANGE UP (ref 3.8–10.5)
WBC # FLD AUTO: 6.24 K/UL — SIGNIFICANT CHANGE UP (ref 3.8–10.5)
WBC # FLD AUTO: 6.5 K/UL — SIGNIFICANT CHANGE UP (ref 3.8–10.5)

## 2024-06-10 PROCEDURE — 99232 SBSQ HOSP IP/OBS MODERATE 35: CPT

## 2024-06-10 PROCEDURE — 99223 1ST HOSP IP/OBS HIGH 75: CPT

## 2024-06-10 RX ORDER — FOLIC ACID 0.8 MG
1 TABLET ORAL DAILY
Refills: 0 | Status: DISCONTINUED | OUTPATIENT
Start: 2024-06-10 | End: 2024-06-13

## 2024-06-10 RX ADMIN — PANTOPRAZOLE SODIUM 40 MILLIGRAM(S): 20 TABLET, DELAYED RELEASE ORAL at 17:10

## 2024-06-10 RX ADMIN — Medication 1250 UNIT(S): at 17:10

## 2024-06-10 RX ADMIN — NYSTATIN CREAM 1 APPLICATION(S): 100000 CREAM TOPICAL at 05:27

## 2024-06-10 RX ADMIN — NYSTATIN CREAM 1 APPLICATION(S): 100000 CREAM TOPICAL at 17:13

## 2024-06-10 RX ADMIN — PANTOPRAZOLE SODIUM 40 MILLIGRAM(S): 20 TABLET, DELAYED RELEASE ORAL at 05:27

## 2024-06-10 NOTE — BH CONSULTATION LIAISON ASSESSMENT NOTE - NSBHCHARTREVIEWVS_PSY_A_CORE FT
Vital Signs Last 24 Hrs  T(C): 36.8 (10 David 2024 09:20), Max: 37 (10 David 2024 09:00)  T(F): 98.2 (10 David 2024 09:20), Max: 98.6 (10 David 2024 09:00)  HR: 75 (10 David 2024 09:20) (65 - 89)  BP: 116/65 (10 David 2024 09:20) (99/51 - 130/55)  BP(mean): 74 (10 David 2024 05:12) (74 - 87)  RR: 18 (10 David 2024 09:20) (18 - 20)  SpO2: 98% (10 David 2024 09:20) (95% - 100%)    Parameters below as of 10 David 2024 09:20  Patient On (Oxygen Delivery Method): room air

## 2024-06-10 NOTE — BH CONSULTATION LIAISON ASSESSMENT NOTE - NSBHCHARTREVIEWLAB_PSY_A_CORE FT
Comprehensive Metabolic Panel in AM (06.10.24 @ 06:30)   Sodium: 140 mmol/L  Potassium: 3.8 mmol/L  Chloride: 106: Chloride reference range changed from ..10/26/2022   . mmol/L  Carbon Dioxide: 23.0 mmol/L  Anion Gap: 11 mmol/L  Blood Urea Nitrogen: 32.2 mg/dL  Creatinine: 0.67 mg/dL  Glucose: 89 mg/dL  Calcium: 8.7 mg/dL  Protein Total: 4.9 g/dL  Albumin: 2.8 g/dL  Bilirubin Total: 0.7 mg/dL  Alkaline Phosphatase: 48 U/L  Aspartate Aminotransferase (AST/SGOT): 28 U/L  Alanine Aminotransferase (ALT/SGPT): 8 U/L  eGFR: 90: The estimated glomerular filtration rate (eGFR) is calculated using the   2021 CKD-EPI creatinine equation, which does not have a coefficient for   race and is validated in individuals 18 years of age and older (N Engl J   Med 2021; 385:6190-0655). Creatinine-based eGFR may be inaccurate in   various situations including but not limited to extremes of muscle mass,   altered dietary protein intake, or medications that affect renal tubular   creatinine secretion. mL/min/1.73m2

## 2024-06-10 NOTE — BH CONSULTATION LIAISON ASSESSMENT NOTE - HPI (INCLUDE ILLNESS QUALITY, SEVERITY, DURATION, TIMING, CONTEXT, MODIFYING FACTORS, ASSOCIATED SIGNS AND SYMPTOMS)
Patient is a 78 year old female from [facility], w/PMHx PE on eliquis, pphx of schizophrenia presented from s/p fall with complaints of dizziness, was found to have a downtrending Hg and currently receiving blood transfusion.      Patient seen today at bedside with [facility] staff.  Patient calm, cooperative and pleasant upon interview.  She is a/ox4, reports she "knows today is June 10th because it's my grand daughter's birthday."  Patient denies any acute mood symptoms. She denies any AVH or paranoia.  Patient denies any SI/HI, intent or plan when asked directly.      [facility] packet reviewed, patient on abilify 400mg IM qmonthly, next due 6/20/24.   No other psychiatric medications listed.

## 2024-06-10 NOTE — PROGRESS NOTE ADULT - ASSESSMENT
78 year old female w/PMHx PE on eliquis, schizophrenia p/w dizziness and near syncopal episodes, also found to have acute worsening anemia, suspct GIB.     #Dizziness, near-syncope  - likely 2/2 anemia  - CTH/cervical without acute findings  - EKG without acute ST changes, trop neg x2  - cards and GI reccs reviewed and appreciated  - TTE without concerning findings, reviewed by cardio  - monitor on tele  - orthostatic vitals pending    #Anemia suspect 2/2 GIB  - unclear baseline  - s/p 1U prbc overnight, hgb this am still low, no repeat collected  - pt getting addtional 1U prbc  - active T&S  - PPI BID  - GI consulted, EGD today  - holding Eliquis for now, resume based on GI recs following EGD    #PE  - was on eliquis, now on hold -- per pt, dx with PE at [place] 1 year ago, no prior h/o clotting disorder  - vital stable on RA    #h/o schizophrenia   - Currently admitted to inpatient psych at [place]  - On IM 400mg Aripiprazole every 28 days, next dose due 6/20/24  - psych consult while admitted    DVT ppx: SCDs   78 year old female w/PMHx PE on eliquis, schizophrenia p/w dizziness and near syncopal episodes, also found to have acute worsening anemia, suspct GIB.     #Dizziness, near-syncope  - likely 2/2 anemia  - CTH/cervical without acute findings  - EKG without acute ST changes, trop neg x2  - cards and GI reccs reviewed and appreciated  - TTE without concerning findings, reviewed by cardio  - monitor on tele  - orthostatic vitals pending    #Anemia suspect 2/2 GIB  - unclear baseline  - s/p 1U prbc overnight, hgb this am still low, no repeat collected  - pt getting addtional 1U prbc  - active T&S  - PPI BID  - GI consulted, EGD today  - holding Eliquis for now, resume based on GI recs following EGD    #PE  - was on eliquis, now on hold -- per pt, dx with PE at [place] 1 year ago, no prior h/o clotting disorder  - vital stable on RA    #h/o schizophrenia   - Currently admitted to inpatient psych at [place]  - On IM 400mg Aripiprazole every 28 days, next dose due 6/20/24  - psych following while admitted  - c/w 1:1    DVT ppx: SCDs   78 year old female w/PMHx PE on eliquis, schizophrenia p/w dizziness and near syncopal episodes, also found to have acute worsening anemia, suspct GIB.     #Dizziness, near-syncope  - likely 2/2 anemia  - CTH/cervical without acute findings  - EKG without acute ST changes, trop neg x2  - cards and GI reccs reviewed and appreciated  - TTE without concerning findings, reviewed by cardio  - monitor on tele  - orthostatic vitals pending    #Anemia suspect 2/2 GIB  - unclear baseline  - s/p 1U prbc overnight, hgb this am still low, no repeat collected  - pt getting addtional 1U prbc  - active T&S  - PPI BID  - GI consulted, EGD today  - holding Eliquis for now, resume based on GI recs following EGD  - check iron studies and retic count  - start folic acid supplementation    #PE  - was on eliquis, now on hold -- per pt, dx with PE at [place] 1 year ago, no prior h/o clotting disorder  - vital stable on RA    #h/o schizophrenia   - Currently admitted to inpatient psych at [place]  - On IM 400mg Aripiprazole every 28 days, next dose due 6/20/24  - psych following while admitted  - c/w 1:1    DVT ppx: SCDs   78 year old female w/PMHx PE on eliquis, schizophrenia p/w dizziness and near syncopal episodes, also found to have acute worsening anemia, suspct GIB.     #Dizziness, near-syncope  - likely 2/2 anemia  - CTH/cervical without acute findings  - EKG without acute ST changes, trop neg x2  - cards and GI reccs reviewed and appreciated  - TTE without concerning findings, reviewed by cardio  - monitor on tele  - orthostatic vitals pending    #Anemia suspect 2/2 GIB  - unclear baseline  - s/p 1U prbc overnight, hgb this am still low, no repeat collected  - pt getting addtional 1U prbc  - active T&S  - PPI BID  - GI consulted, EGD today  - holding Eliquis for now, resume based on GI recs following EGD  - anemia work up sent  - start folic acid    #PE  - was on eliquis, now on hold -- per pt, dx with PE at [place] 1 year ago, no prior h/o clotting disorder  - vital stable on RA    #h/o schizophrenia   - Currently admitted to inpatient psych at [place]  - On IM 400mg Aripiprazole every 28 days, next dose due 6/20/24  - psych following while admitted  - c/w 1:1    DVT ppx: SCDs

## 2024-06-10 NOTE — BH CONSULTATION LIAISON ASSESSMENT NOTE - SUMMARY
Patient is a 78 year old female from [facility], w/PMHx PE on eliquis, pphx of schizophrenia presented from s/p fall with complaints of dizziness, was found to have a downtrending Hg and currently receiving blood transfusion.      Patient seen at bedside with [place] [facility] staff.  She is calm and cooperative, mood normal, affect somewhat constricted.  Patient denies any acute psychiatric concerns.  Meds reconciled with [place] packet.   She is due for her abilify 400mg IM injection on 6/20/24.    RECS  -if she remains hospitalized by time of next depot injection, can give here  -return to [place] when medically clear

## 2024-06-10 NOTE — CHART NOTE - NSCHARTNOTEFT_GEN_A_CORE
Chart Review for patient from [facility]. PPC Packet Reviewed.     This patient is under the Office of Mental Health. The appropriate surrogate decision maker for the patient, [facility], is her son, Santo Tripp, who is reasonably available, willing, and competent to act. As the patient's surrogate, he can make all medical decisions for the patient, EXCEPT WITHHOLDING OR WITHDRAWING LIFE SUSTAINING TREATMENT. IF the patient does not have capacity, the team should engage with her son for medical decision making. Special procedures would be required if any MOLST directives inclusive of DNR/DNI needed to be addressed. Ethics/Palliative Care can assist.     Santo Tripp: [phone] or [phone]     Jessica Pena MA    Ethics Fellow   [phone]

## 2024-06-10 NOTE — BH CONSULTATION LIAISON ASSESSMENT NOTE - CURRENT MEDICATION
MEDICATIONS  (STANDING):  cholecalciferol 1250 Unit(s) Oral daily  nystatin Powder 1 Application(s) Topical two times a day  pantoprazole  Injectable 40 milliGRAM(s) IV Push two times a day    MEDICATIONS  (PRN):  acetaminophen     Tablet .. 650 milliGRAM(s) Oral every 6 hours PRN Temp greater or equal to 38C (100.4F), Mild Pain (1 - 3)  aluminum hydroxide/magnesium hydroxide/simethicone Suspension 30 milliLiter(s) Oral every 4 hours PRN Dyspepsia  melatonin 3 milliGRAM(s) Oral at bedtime PRN Insomnia  ondansetron Injectable 4 milliGRAM(s) IV Push every 8 hours PRN Nausea and/or Vomiting

## 2024-06-10 NOTE — BH CONSULTATION LIAISON ASSESSMENT NOTE - NSBHATTESTAPPBILLTIME_PSY_A_CORE
I attest my time as FRANK is greater than 50% of the total combined time spent on qualifying patient care activities. I have reviewed and verified the documentation.

## 2024-06-11 LAB
FERRITIN SERPL-MCNC: 208 NG/ML — SIGNIFICANT CHANGE UP (ref 13–330)
HCT VFR BLD CALC: 20.9 % — CRITICAL LOW (ref 34.5–45)
HGB BLD-MCNC: 7.1 G/DL — LOW (ref 11.5–15.5)
HIV 1 & 2 AB SERPL IA.RAPID: SIGNIFICANT CHANGE UP
IRON SATN MFR SERPL: 15 % — SIGNIFICANT CHANGE UP (ref 14–50)
IRON SATN MFR SERPL: 37 UG/DL — SIGNIFICANT CHANGE UP (ref 37–145)
MCHC RBC-ENTMCNC: 31.7 PG — SIGNIFICANT CHANGE UP (ref 27–34)
MCHC RBC-ENTMCNC: 34 GM/DL — SIGNIFICANT CHANGE UP (ref 32–36)
MCV RBC AUTO: 93.3 FL — SIGNIFICANT CHANGE UP (ref 80–100)
PLATELET # BLD AUTO: 244 K/UL — SIGNIFICANT CHANGE UP (ref 150–400)
RBC # BLD: 2.24 M/UL — LOW (ref 3.8–5.2)
RBC # BLD: 2.24 M/UL — LOW (ref 3.8–5.2)
RBC # FLD: 17 % — HIGH (ref 10.3–14.5)
RETICS #: 105 K/UL — SIGNIFICANT CHANGE UP (ref 25–125)
RETICS/RBC NFR: 4.7 % — HIGH (ref 0.5–2.5)
TIBC SERPL-MCNC: 245 UG/DL — SIGNIFICANT CHANGE UP (ref 220–430)
TRANSFERRIN SERPL-MCNC: 171 MG/DL — LOW (ref 192–382)
VIT B12 SERPL-MCNC: 288 PG/ML — SIGNIFICANT CHANGE UP (ref 232–1245)
WBC # BLD: 4.77 K/UL — SIGNIFICANT CHANGE UP (ref 3.8–10.5)
WBC # FLD AUTO: 4.77 K/UL — SIGNIFICANT CHANGE UP (ref 3.8–10.5)

## 2024-06-11 PROCEDURE — 93970 EXTREMITY STUDY: CPT | Mod: 26

## 2024-06-11 PROCEDURE — 99232 SBSQ HOSP IP/OBS MODERATE 35: CPT

## 2024-06-11 PROCEDURE — 43235 EGD DIAGNOSTIC BRUSH WASH: CPT

## 2024-06-11 RX ORDER — SODIUM CHLORIDE 9 MG/ML
500 INJECTION INTRAMUSCULAR; INTRAVENOUS; SUBCUTANEOUS ONCE
Refills: 0 | Status: COMPLETED | OUTPATIENT
Start: 2024-06-11 | End: 2024-06-11

## 2024-06-11 RX ADMIN — PANTOPRAZOLE SODIUM 40 MILLIGRAM(S): 20 TABLET, DELAYED RELEASE ORAL at 05:54

## 2024-06-11 RX ADMIN — PANTOPRAZOLE SODIUM 40 MILLIGRAM(S): 20 TABLET, DELAYED RELEASE ORAL at 18:26

## 2024-06-11 NOTE — PROGRESS NOTE ADULT - ASSESSMENT
78 year old female w/PMHx PE on eliquis, schizophrenia p/w dizziness and near syncopal episodes, also found to have acute worsening anemia, suspct GIB.     #Dizziness, near-syncope  - likely 2/2 anemia  - CTH/cervical without acute findings  - EKG without acute ST changes, trop neg x2  - cards and GI reccs reviewed and appreciated  - TTE without concerning findings, reviewed by cardio  - monitor on tele  - orthostatic vitals pending    #Anemia suspect 2/2 GIB  - unclear baseline  - s/p 2U prbc on admission, 1U prbc this AM for hgb 7.1  - s/p EGD 6/11 with duodenal ulcer present  - PPI BID x 8 weeks then 1x/day   - hold eliquis for 72 hr then restart if no stigmata of bleeding  - anemia work up sent, iron abd B12 wnl, retic count appropriate  - c/w folic acid    #PE  - per pt, dx with PE at [place] >1 year ago, no prior h/o clotting disorder  - as pt has standard treatment duration of anticoagulation will check CTA chest and LE duplex to rule out VTE and if all negative will stop eliquis  - discussed plan with pt and her son/HCP at the bedside, all parties agreeable  - if positive will resume eliquis 72hr after egd per GI recs    #h/o schizophrenia   - Currently admitted to inpatient psych at [place]  - On IM 400mg Aripiprazole every 28 days, next dose due 6/20/24  - psych following while admitted  - c/w 1:1    DVT ppx: SCDs

## 2024-06-11 NOTE — PROVIDER CONTACT NOTE (CRITICAL VALUE NOTIFICATION) - ASSESSMENT
Patient A/Ox4, no distress.
Pt c/o of weakness and feeling "tired". No changes since initial assessment, pt appears in no acute distress, no visual signs of bleeding. VSS.

## 2024-06-11 NOTE — PROVIDER CONTACT NOTE (CRITICAL VALUE NOTIFICATION) - SITUATION
Patient admitted for GI bleed, scheduled for EGD today.
hemoglobin 6.7  hematocrit 20.1
Pt admitted today, 6/9, after syncope and collapse, pt dx with symptomatic anemia. Critical value called to floor.

## 2024-06-12 LAB
HCT VFR BLD CALC: 25.4 % — LOW (ref 34.5–45)
HGB BLD-MCNC: 8.7 G/DL — LOW (ref 11.5–15.5)
MCHC RBC-ENTMCNC: 32.1 PG — SIGNIFICANT CHANGE UP (ref 27–34)
MCHC RBC-ENTMCNC: 34.3 GM/DL — SIGNIFICANT CHANGE UP (ref 32–36)
MCV RBC AUTO: 93.7 FL — SIGNIFICANT CHANGE UP (ref 80–100)
PLATELET # BLD AUTO: 257 K/UL — SIGNIFICANT CHANGE UP (ref 150–400)
RBC # BLD: 2.71 M/UL — LOW (ref 3.8–5.2)
RBC # FLD: 17.5 % — HIGH (ref 10.3–14.5)
WBC # BLD: 5.36 K/UL — SIGNIFICANT CHANGE UP (ref 3.8–10.5)
WBC # FLD AUTO: 5.36 K/UL — SIGNIFICANT CHANGE UP (ref 3.8–10.5)

## 2024-06-12 PROCEDURE — 99233 SBSQ HOSP IP/OBS HIGH 50: CPT

## 2024-06-12 PROCEDURE — 99232 SBSQ HOSP IP/OBS MODERATE 35: CPT

## 2024-06-12 PROCEDURE — 71275 CT ANGIOGRAPHY CHEST: CPT | Mod: 26

## 2024-06-12 RX ADMIN — Medication 1 MILLIGRAM(S): at 13:18

## 2024-06-12 RX ADMIN — PANTOPRAZOLE SODIUM 40 MILLIGRAM(S): 20 TABLET, DELAYED RELEASE ORAL at 17:58

## 2024-06-12 RX ADMIN — SODIUM CHLORIDE 500 MILLILITER(S): 9 INJECTION INTRAMUSCULAR; INTRAVENOUS; SUBCUTANEOUS at 00:36

## 2024-06-12 RX ADMIN — Medication 1250 UNIT(S): at 13:18

## 2024-06-12 RX ADMIN — PANTOPRAZOLE SODIUM 40 MILLIGRAM(S): 20 TABLET, DELAYED RELEASE ORAL at 05:48

## 2024-06-12 NOTE — PROGRESS NOTE ADULT - ASSESSMENT
78 year old female w/PMHx PE on eliquis, schizophrenia p/w dizziness and near syncopal episodes, also found to have acute worsening anemia, suspct GIB.     Dizziness, near-syncope  - likely 2/2 anemia  - CTH/cervical without acute findings  - EKG without acute ST changes, trop neg x2  - cards and GI recs appreciated  - TTE without concerning findings, reviewed by cardio    Anemia suspect 2/2 GIB  PUD   - unclear baseline  - s/p 3U prbc   - s/p EGD 6/11 with duodenal ulcer present  - PPI BID x 8 weeks then 1x/day   - hold eliquis for 72 hr then restart if no stigmata of bleeding  - anemia work up sent, iron abd B12 wnl, retic count appropriate  - c/w folic acid  - Will Check H pylori stool antigen, if positive treat with quadruple therapy     hx of PE  - per pt, dx with PE at [place] >1 year ago, no prior h/o clotting disorder  - as pt has standard treatment duration of anticoagulation, CTA chest and LE duplex have been negative here  - Will not resume Eliquis at this time  - Pt to follow up with Heme/Onc after DC     h/o schizophrenia   - Currently admitted to inpatient psych at [place]  - On IM 400mg Aripiprazole every 28 days, next dose due 6/20/24  - psych following while admitted  - c/w 1:1    DVT ppx: SCDs  Dispo - Likely DC tomorrow

## 2024-06-12 NOTE — PROGRESS NOTE ADULT - ASSESSMENT
78-year-old female with a history of PE on Eliquis who presents with near syncope and was found to be anemic    Anemia  Duodenal Ulcer  Continue regular diet  PPI BID for 12 weeks then daily indefinitely while on anticoagulation  Resume anticoagulation tomorrow if indicated  Avoid nsaids  Check an h pylori stool antigen, if positive treat with quadruple therapy even though she is on ppi

## 2024-06-12 NOTE — PROGRESS NOTE ADULT - SUBJECTIVE AND OBJECTIVE BOX
[facility] Division of Medicine    SUBJECTIVE / OVERNIGHT EVENTS: No overnight events as per RN. Pt seen at the bedside. Endorses feeling well. Denies any episodes of bloody or black BMs. Denies any new complaints. All other systems reviewed and are negative.    MEDICATIONS  (STANDING):  cholecalciferol 1250 Unit(s) Oral daily  nystatin Powder 1 Application(s) Topical two times a day  pantoprazole  Injectable 40 milliGRAM(s) IV Push two times a day    MEDICATIONS  (PRN):  acetaminophen     Tablet .. 650 milliGRAM(s) Oral every 6 hours PRN Temp greater or equal to 38C (100.4F), Mild Pain (1 - 3)  aluminum hydroxide/magnesium hydroxide/simethicone Suspension 30 milliLiter(s) Oral every 4 hours PRN Dyspepsia  melatonin 3 milliGRAM(s) Oral at bedtime PRN Insomnia  ondansetron Injectable 4 milliGRAM(s) IV Push every 8 hours PRN Nausea and/or Vomiting      I&O's Summary      acetaminophen     Tablet .. 650 milliGRAM(s) Oral every 6 hours PRN  aluminum hydroxide/magnesium hydroxide/simethicone Suspension 30 milliLiter(s) Oral every 4 hours PRN  cholecalciferol 1250 Unit(s) Oral daily  melatonin 3 milliGRAM(s) Oral at bedtime PRN  nystatin Powder 1 Application(s) Topical two times a day  ondansetron Injectable 4 milliGRAM(s) IV Push every 8 hours PRN  pantoprazole  Injectable 40 milliGRAM(s) IV Push two times a day      PHYSICAL EXAM:  Vital Signs Last 24 Hrs  T(C): 36.8 (10 David 2024 09:20), Max: 37 (10 David 2024 09:00)  T(F): 98.2 (10 David 2024 09:20), Max: 98.6 (10 David 2024 09:00)  HR: 75 (10 David 2024 09:20) (65 - 92)  BP: 116/65 (10 David 2024 09:20) (99/51 - 130/55)  BP(mean): 74 (10 David 2024 05:12) (74 - 87)  RR: 18 (10 David 2024 09:20) (18 - 20)  SpO2: 98% (10 David 2024 09:20) (94% - 100%)    Parameters below as of 10 David 2024 09:20  Patient On (Oxygen Delivery Method): room air          CONSTITUTIONAL: no apparent distress  EYES: PERRLA  ENMT: Oral mucosa with moist membranes  RESP: No respiratory distress, clear to auscultation bilaterally, no wheezes or rales  CV: RRR, +S1S2, no peripheral edema  GI: Soft, NT, ND  PSYCH: A+O x 3, mood and affect appropriate  NEURO: Cooperative, upper and lower motor function grossly intact bilaterally, sensation grossly intact throughout      LABS:                        6.7    5.68  )-----------( 224      ( 10 David 2024 06:30 )             20.1     06-10    140  |  106  |  32.2<H>  ----------------------------<  89  3.8   |  23.0  |  0.67    Ca    8.7      10 David 2024 06:30  Phos  3.7     06-10  Mg     1.8     06-10    TPro  4.9<L>  /  Alb  2.8<L>  /  TBili  0.7  /  DBili  x   /  AST  28  /  ALT  8   /  AlkPhos  48  06-10    PT/INR - ( 10 David 2024 07:20 )   PT: 14.8 sec;   INR: 1.34 ratio         PTT - ( 09 Jun 2024 15:19 )  PTT:31.6 sec      Urinalysis Basic - ( 10 David 2024 06:30 )    Color: x / Appearance: x / SG: x / pH: x  Gluc: 89 mg/dL / Ketone: x  / Bili: x / Urobili: x   Blood: x / Protein: x / Nitrite: x   Leuk Esterase: x / RBC: x / WBC x   Sq Epi: x / Non Sq Epi: x / Bacteria: x        CAPILLARY BLOOD GLUCOSE          IMAGING:                                  
[facility] Division of Medicine    SUBJECTIVE / OVERNIGHT EVENTS: No overnight events as per RN. Pt seen at the bedside. Endorses feeling well. Denies any episodes of bloody or black BMs. Denies any new complaints. All other systems reviewed and are negative.    MEDICATIONS  (STANDING):  cholecalciferol 1250 Unit(s) Oral daily  folic acid 1 milliGRAM(s) Oral daily  nystatin Powder 1 Application(s) Topical two times a day  pantoprazole  Injectable 40 milliGRAM(s) IV Push two times a day  sodium chloride 0.9% Bolus 500 milliLiter(s) IV Bolus once    MEDICATIONS  (PRN):  acetaminophen     Tablet .. 650 milliGRAM(s) Oral every 6 hours PRN Temp greater or equal to 38C (100.4F), Mild Pain (1 - 3)  aluminum hydroxide/magnesium hydroxide/simethicone Suspension 30 milliLiter(s) Oral every 4 hours PRN Dyspepsia  melatonin 3 milliGRAM(s) Oral at bedtime PRN Insomnia  ondansetron Injectable 4 milliGRAM(s) IV Push every 8 hours PRN Nausea and/or Vomiting      I&O's Summary      acetaminophen     Tablet .. 650 milliGRAM(s) Oral every 6 hours PRN  aluminum hydroxide/magnesium hydroxide/simethicone Suspension 30 milliLiter(s) Oral every 4 hours PRN  cholecalciferol 1250 Unit(s) Oral daily  folic acid 1 milliGRAM(s) Oral daily  melatonin 3 milliGRAM(s) Oral at bedtime PRN  nystatin Powder 1 Application(s) Topical two times a day  ondansetron Injectable 4 milliGRAM(s) IV Push every 8 hours PRN  pantoprazole  Injectable 40 milliGRAM(s) IV Push two times a day  sodium chloride 0.9% Bolus 500 milliLiter(s) IV Bolus once      PHYSICAL EXAM:  Vital Signs Last 24 Hrs  T(C): 36.7 (11 Jun 2024 11:10), Max: 36.8 (11 Jun 2024 00:24)  T(F): 98 (11 Jun 2024 11:10), Max: 98.2 (11 Jun 2024 00:24)  HR: 66 (11 Jun 2024 11:10) (61 - 75)  BP: 117/64 (11 Jun 2024 11:10) (105/57 - 126/66)  BP(mean): --  RR: 17 (11 Jun 2024 11:10) (16 - 18)  SpO2: 96% (11 Jun 2024 11:10) (95% - 100%)    Parameters below as of 11 Jun 2024 11:10  Patient On (Oxygen Delivery Method): room air        CONSTITUTIONAL: no apparent distress  EYES: PERRLA  ENMT: Oral mucosa with moist membranes  RESP: No respiratory distress, clear to auscultation bilaterally, no wheezes or rales  CV: RRR, +S1S2, no peripheral edema  GI: Soft, NT, ND  PSYCH: A+O x 3, mood and affect appropriate  NEURO: Cooperative, upper and lower motor function grossly intact bilaterally, sensation grossly intact throughout        LABS:                        7.1    4.77  )-----------( 244      ( 11 Jun 2024 04:16 )             20.9     06-10    140  |  106  |  32.2<H>  ----------------------------<  89  3.8   |  23.0  |  0.67    Ca    8.7      10 David 2024 06:30  Phos  3.7     06-10  Mg     1.8     06-10    TPro  4.9<L>  /  Alb  2.8<L>  /  TBili  0.7  /  DBili  x   /  AST  28  /  ALT  8   /  AlkPhos  48  06-10    PT/INR - ( 10 David 2024 07:20 )   PT: 14.8 sec;   INR: 1.34 ratio         PTT - ( 09 Jun 2024 15:19 )  PTT:31.6 sec      Urinalysis Basic - ( 10 David 2024 06:30 )    Color: x / Appearance: x / SG: x / pH: x  Gluc: 89 mg/dL / Ketone: x  / Bili: x / Urobili: x   Blood: x / Protein: x / Nitrite: x   Leuk Esterase: x / RBC: x / WBC x   Sq Epi: x / Non Sq Epi: x / Bacteria: x        CAPILLARY BLOOD GLUCOSE          IMAGING:                                  
    Chief Complaint:  Patient is a 78y old  Female who presents with a chief complaint of Anemia (2024 14:49)      HPI/ 24 hr events: Patient seen and examined at bedside. She ate dinner and denies acute complaints. She denies nausea or vomiting.     MEDICATIONS:   MEDICATIONS  (STANDING):  cholecalciferol 1250 Unit(s) Oral daily  folic acid 1 milliGRAM(s) Oral daily  nystatin Powder 1 Application(s) Topical two times a day  pantoprazole  Injectable 40 milliGRAM(s) IV Push two times a day    MEDICATIONS  (PRN):  acetaminophen     Tablet .. 650 milliGRAM(s) Oral every 6 hours PRN Temp greater or equal to 38C (100.4F), Mild Pain (1 - 3)  aluminum hydroxide/magnesium hydroxide/simethicone Suspension 30 milliLiter(s) Oral every 4 hours PRN Dyspepsia  melatonin 3 milliGRAM(s) Oral at bedtime PRN Insomnia  ondansetron Injectable 4 milliGRAM(s) IV Push every 8 hours PRN Nausea and/or Vomiting      ALLERGIES:   Allergies    No Known Allergies    Intolerances        VITAL SIGNS:   Vital Signs Last 24 Hrs  T(C): 36.6 (2024 04:32), Max: 37.1 (2024 15:36)  T(F): 97.8 (2024 04:32), Max: 98.7 (2024 15:36)  HR: 64 (2024 04:32) (60 - 73)  BP: 100/51 (2024 04:32) (100/51 - 143/75)  BP(mean): --  RR: 18 (2024 04:32) (16 - 18)  SpO2: 97% (2024 04:32) (96% - 99%)    Parameters below as of 2024 04:32  Patient On (Oxygen Delivery Method): room air      I&O's Summary      PHYSICAL EXAM:   GENERAL:  No acute distress  HEENT:  NC/AT, conjunctiva clear, sclera anicteric  CHEST:  No increased effort, breath sounds clear  HEART:  Regular rate  ABDOMEN:  Soft, non-tender, non-distended, normoactive bowel sounds, no rebound or guarding  EXTREMITIES: No edema  SKIN:  Warm, dry  NEURO:  Calm, cooperative    LABS:                        8.7    5.36  )-----------( 257      ( 2024 03:12 )             25.4                             RADIOLOGY & ADDITIONAL STUDIES:          EGD Report        Date: 2024        Patient Name: MAXIMO DEL RIO        MRN: [id number]        Account Number:        [id number]        Gender: Female         (age): 1945 (78)        Instrument(s):        GIF  ([id number])([id number])        Attending/Fellow:        Patricio Rosales MD                Procedure Room #:        PROCEDURE ROOM 1                        ASA Class:        P3 - 2024 12:59 PM Patricio Rosales MD        History of Present Illness:        Acute blood loss anemia presenting for upper endoscopy evaluation        Administered Medications:        As per Anesthesiology Record        Indications:        Acute blood loss anemia: 285.1 - D62        Procedure:        The procedure, indications, preparation and potential complications were    explained to the patient, who indicated understanding and signed the    corresponding consent forms. MAC was administered by anesthesiologist.    Continuous pulse oximetry and blood pressure monitoring were used throughout the    procedure. Supplemental oxygen was used. Patient was placed in the left lateral    decubitus position. The endoscope was introduced through the mouth and advanced    under direct visualization until the second part of the duodenum was reached.    Patient tolerance to the procedure was good. The procedure was not difficult.    Blood loss was minimal.        Limitations/Complications:        There were no apparent limitations or complications        Findings:        Esophagus Additional esophagus findings - Distal non-obstructing esophageal    ring. Z-line regular..        Stomach Normal stomach.        Duodenum Excavated lesions A single cratered non-bleeding 1 cm ulcer was found    in the duodenal bulb. Clean based ulcer, Gregor Classification III.        Impressions:        Normal stomach.        Distal non-obstructing esophageal ring. Z-line regular. .        Ulcer in the duodenal bulb.        Plan:        Continue PPI BID for 8 weeks, then transition to once daily likely indefinitely        Avoid all NSAIDs        May consider repeat endoscopy in 8 weeks to monitor for mucosal healing +/-    biopsies, not obtained given acuity.        Advance diet as tolerated        Can restart Apixaban in 72 hours if no further stigmata of bleeding, in the    interim maintain DVT ppx        Return to floor for further management                Patricio Rosales MD        Version 1, Electronically signed on 2024 1:06:07 PM by Patricio Rosales MD  
                                                         [facility]                                                         CARDIOLOGY AT [place] [address]                                                         Telephone: [phone]. Fax:[phone]                                                                             PROGRESS NOTE    Chief Complaint upon presentation to hospital: mechanical fall  Initial reason for Consult: near syncope   Reason for follow up: GIB       Review of symptoms:   Cardiac:  No chest pain. No dyspnea. No palpitations.  Respiratory: no cough. No dyspnea  Gastrointestinal: No diarrhea. No abdominal pain. No bleeding.   Neuro: No focal neuro complaints.    Vitals:  T(C): 36.6 (06-09-24 @ 05:35), Max: 36.9 (06-08-24 @ 19:41)  HR: 79 (06-09-24 @ 05:35) (79 - 97)  BP: 112/69 (06-09-24 @ 05:35) (112/69 - 154/79)  RR: 16 (06-09-24 @ 05:35) (15 - 19)  SpO2: 97% (06-09-24 @ 05:35) (96% - 98%)  Wt(kg): --  I&O's Summary    Weight (kg): 84.4 (06-08 @ 07:22)    PHYSICAL EXAM:  Appearance: Comfortable. No acute distress  HEENT:  Atraumatic. Normocephalic.  Normal oral mucosa  Neurologic: A & O x 3, no gross focal deficits.  Cardiovascular: RRR S1 S2, No murmur, no rubs/gallops. No JVD  Respiratory: Lungs clear to auscultation, unlabored   Gastrointestinal:  Soft, Non-tender, + BS  Lower Extremities: 2+ Peripheral Pulses, No clubbing, cyanosis, or edema  Psychiatry: Patient is calm. No agitation.   Skin: warm and dry.    CURRENT CARDIAC MEDICATIONS:      CURRENT OTHER MEDICATIONS:  pantoprazole  Injectable 40 milliGRAM(s) IV Push two times a day      LABS:	 	                            7.2    7.40  )-----------( 231      ( 09 Jun 2024 06:00 )             21.4     06-09    139  |  107  |  43.0<H>  ----------------------------<  100<H>  4.1   |  22.0  |  0.80    Ca    8.6      09 Jun 2024 06:00    TPro  5.3<L>  /  Alb  3.0<L>  /  TBili  0.5  /  DBili  x   /  AST  33<H>  /  ALT  7   /  AlkPhos  50  06-09      Lipid Profile:   HgA1c:   TSH:         
[facility] Division of Hospital Medicine    Chief Complaint:  Anemia     SUBJECTIVE / OVERNIGHT EVENTS:  Pt reports no complaints     Patient denies chest pain, SOB, abd pain, N/V, fever, chills, dysuria or any other complaints. All remainder ROS negative.     MEDICATIONS  (STANDING):  cholecalciferol 1250 Unit(s) Oral daily  folic acid 1 milliGRAM(s) Oral daily  nystatin Powder 1 Application(s) Topical two times a day  pantoprazole  Injectable 40 milliGRAM(s) IV Push two times a day    MEDICATIONS  (PRN):  acetaminophen     Tablet .. 650 milliGRAM(s) Oral every 6 hours PRN Temp greater or equal to 38C (100.4F), Mild Pain (1 - 3)  aluminum hydroxide/magnesium hydroxide/simethicone Suspension 30 milliLiter(s) Oral every 4 hours PRN Dyspepsia  melatonin 3 milliGRAM(s) Oral at bedtime PRN Insomnia  ondansetron Injectable 4 milliGRAM(s) IV Push every 8 hours PRN Nausea and/or Vomiting        I&O's Summary      PHYSICAL EXAM:  Vital Signs Last 24 Hrs  T(C): 36.5 (12 Jun 2024 15:19), Max: 36.7 (11 Jun 2024 19:35)  T(F): 97.7 (12 Jun 2024 15:19), Max: 98 (11 Jun 2024 19:35)  HR: 62 (12 Jun 2024 15:19) (60 - 70)  BP: 112/53 (12 Jun 2024 15:19) (100/51 - 143/75)  BP(mean): --  RR: 18 (12 Jun 2024 15:19) (18 - 18)  SpO2: 96% (12 Jun 2024 15:19) (96% - 99%)    Parameters below as of 12 Jun 2024 15:19  Patient On (Oxygen Delivery Method): room air          CONSTITUTIONAL: NAD, sitting up in bed  RESPIRATORY: Normal respiratory effort; lungs are clear to auscultation bilaterally  CARDIOVASCULAR: Regular rate and rhythm, normal S1 and S2   ABDOMEN: Nontender to palpation, normoactive bowel sounds  MUSCULOSKELETAL:  No clubbing or cyanosis of digits   PSYCH: A+O to person, place; affect appropriate  NEUROLOGY: No gross sensory or motor deficits       LABS:                        8.7    5.36  )-----------( 257      ( 12 Jun 2024 03:12 )             25.4

## 2024-06-13 ENCOUNTER — TRANSCRIPTION ENCOUNTER (OUTPATIENT)
Age: 79
End: 2024-06-13

## 2024-06-13 VITALS
HEART RATE: 64 BPM | SYSTOLIC BLOOD PRESSURE: 111 MMHG | DIASTOLIC BLOOD PRESSURE: 69 MMHG | OXYGEN SATURATION: 99 % | TEMPERATURE: 98 F | RESPIRATION RATE: 18 BRPM

## 2024-06-13 LAB
ANION GAP SERPL CALC-SCNC: 8 MMOL/L — SIGNIFICANT CHANGE UP (ref 5–17)
BASOPHILS # BLD AUTO: 0.03 K/UL — SIGNIFICANT CHANGE UP (ref 0–0.2)
BASOPHILS NFR BLD AUTO: 0.6 % — SIGNIFICANT CHANGE UP (ref 0–2)
BUN SERPL-MCNC: 23.7 MG/DL — HIGH (ref 8–20)
CALCIUM SERPL-MCNC: 8.7 MG/DL — SIGNIFICANT CHANGE UP (ref 8.4–10.5)
CHLORIDE SERPL-SCNC: 108 MMOL/L — SIGNIFICANT CHANGE UP (ref 96–108)
CO2 SERPL-SCNC: 26 MMOL/L — SIGNIFICANT CHANGE UP (ref 22–29)
CREAT SERPL-MCNC: 0.89 MG/DL — SIGNIFICANT CHANGE UP (ref 0.5–1.3)
EGFR: 67 ML/MIN/1.73M2 — SIGNIFICANT CHANGE UP
EOSINOPHIL # BLD AUTO: 0.25 K/UL — SIGNIFICANT CHANGE UP (ref 0–0.5)
EOSINOPHIL NFR BLD AUTO: 4.6 % — SIGNIFICANT CHANGE UP (ref 0–6)
GLUCOSE SERPL-MCNC: 97 MG/DL — SIGNIFICANT CHANGE UP (ref 70–99)
HCT VFR BLD CALC: 27 % — LOW (ref 34.5–45)
HGB BLD-MCNC: 8.9 G/DL — LOW (ref 11.5–15.5)
IMM GRANULOCYTES NFR BLD AUTO: 0.6 % — SIGNIFICANT CHANGE UP (ref 0–0.9)
LYMPHOCYTES # BLD AUTO: 1.04 K/UL — SIGNIFICANT CHANGE UP (ref 1–3.3)
LYMPHOCYTES # BLD AUTO: 19.1 % — SIGNIFICANT CHANGE UP (ref 13–44)
MCHC RBC-ENTMCNC: 31.1 PG — SIGNIFICANT CHANGE UP (ref 27–34)
MCHC RBC-ENTMCNC: 33 GM/DL — SIGNIFICANT CHANGE UP (ref 32–36)
MCV RBC AUTO: 94.4 FL — SIGNIFICANT CHANGE UP (ref 80–100)
MONOCYTES # BLD AUTO: 0.6 K/UL — SIGNIFICANT CHANGE UP (ref 0–0.9)
MONOCYTES NFR BLD AUTO: 11 % — SIGNIFICANT CHANGE UP (ref 2–14)
NEUTROPHILS # BLD AUTO: 3.5 K/UL — SIGNIFICANT CHANGE UP (ref 1.8–7.4)
NEUTROPHILS NFR BLD AUTO: 64.1 % — SIGNIFICANT CHANGE UP (ref 43–77)
PLATELET # BLD AUTO: 287 K/UL — SIGNIFICANT CHANGE UP (ref 150–400)
POTASSIUM SERPL-MCNC: 3.5 MMOL/L — SIGNIFICANT CHANGE UP (ref 3.5–5.3)
POTASSIUM SERPL-SCNC: 3.5 MMOL/L — SIGNIFICANT CHANGE UP (ref 3.5–5.3)
RBC # BLD: 2.86 M/UL — LOW (ref 3.8–5.2)
RBC # FLD: 17.5 % — HIGH (ref 10.3–14.5)
SODIUM SERPL-SCNC: 142 MMOL/L — SIGNIFICANT CHANGE UP (ref 135–145)
WBC # BLD: 5.45 K/UL — SIGNIFICANT CHANGE UP (ref 3.8–10.5)
WBC # FLD AUTO: 5.45 K/UL — SIGNIFICANT CHANGE UP (ref 3.8–10.5)

## 2024-06-13 PROCEDURE — 72125 CT NECK SPINE W/O DYE: CPT | Mod: MC

## 2024-06-13 PROCEDURE — 86901 BLOOD TYPING SEROLOGIC RH(D): CPT

## 2024-06-13 PROCEDURE — 80053 COMPREHEN METABOLIC PANEL: CPT

## 2024-06-13 PROCEDURE — 86900 BLOOD TYPING SEROLOGIC ABO: CPT

## 2024-06-13 PROCEDURE — 93005 ELECTROCARDIOGRAM TRACING: CPT

## 2024-06-13 PROCEDURE — 86703 HIV-1/HIV-2 1 RESULT ANTBDY: CPT

## 2024-06-13 PROCEDURE — 84100 ASSAY OF PHOSPHORUS: CPT

## 2024-06-13 PROCEDURE — 85730 THROMBOPLASTIN TIME PARTIAL: CPT

## 2024-06-13 PROCEDURE — 71275 CT ANGIOGRAPHY CHEST: CPT | Mod: MC

## 2024-06-13 PROCEDURE — 99239 HOSP IP/OBS DSCHRG MGMT >30: CPT

## 2024-06-13 PROCEDURE — 80076 HEPATIC FUNCTION PANEL: CPT

## 2024-06-13 PROCEDURE — P9016: CPT

## 2024-06-13 PROCEDURE — 86850 RBC ANTIBODY SCREEN: CPT

## 2024-06-13 PROCEDURE — 80048 BASIC METABOLIC PNL TOTAL CA: CPT

## 2024-06-13 PROCEDURE — 84466 ASSAY OF TRANSFERRIN: CPT

## 2024-06-13 PROCEDURE — 36430 TRANSFUSION BLD/BLD COMPNT: CPT

## 2024-06-13 PROCEDURE — 85027 COMPLETE CBC AUTOMATED: CPT

## 2024-06-13 PROCEDURE — 85610 PROTHROMBIN TIME: CPT

## 2024-06-13 PROCEDURE — 82607 VITAMIN B-12: CPT

## 2024-06-13 PROCEDURE — 83735 ASSAY OF MAGNESIUM: CPT

## 2024-06-13 PROCEDURE — 71045 X-RAY EXAM CHEST 1 VIEW: CPT

## 2024-06-13 PROCEDURE — 86923 COMPATIBILITY TEST ELECTRIC: CPT

## 2024-06-13 PROCEDURE — 83550 IRON BINDING TEST: CPT

## 2024-06-13 PROCEDURE — 85025 COMPLETE CBC W/AUTO DIFF WBC: CPT

## 2024-06-13 PROCEDURE — 93306 TTE W/DOPPLER COMPLETE: CPT

## 2024-06-13 PROCEDURE — 82272 OCCULT BLD FECES 1-3 TESTS: CPT

## 2024-06-13 PROCEDURE — 83540 ASSAY OF IRON: CPT

## 2024-06-13 PROCEDURE — 70450 CT HEAD/BRAIN W/O DYE: CPT | Mod: MC

## 2024-06-13 PROCEDURE — 82728 ASSAY OF FERRITIN: CPT

## 2024-06-13 PROCEDURE — 36415 COLL VENOUS BLD VENIPUNCTURE: CPT

## 2024-06-13 PROCEDURE — G0378: CPT

## 2024-06-13 PROCEDURE — 82962 GLUCOSE BLOOD TEST: CPT

## 2024-06-13 PROCEDURE — 93970 EXTREMITY STUDY: CPT

## 2024-06-13 PROCEDURE — 99285 EMERGENCY DEPT VISIT HI MDM: CPT | Mod: 25

## 2024-06-13 PROCEDURE — 84484 ASSAY OF TROPONIN QUANT: CPT

## 2024-06-13 PROCEDURE — 85045 AUTOMATED RETICULOCYTE COUNT: CPT

## 2024-06-13 RX ORDER — PANTOPRAZOLE SODIUM 20 MG/1
1 TABLET, DELAYED RELEASE ORAL
Qty: 60 | Refills: 0
Start: 2024-06-13 | End: 2024-07-12

## 2024-06-13 RX ORDER — FOLIC ACID 0.8 MG
1 TABLET ORAL
Qty: 0 | Refills: 0 | DISCHARGE
Start: 2024-06-13

## 2024-06-13 RX ORDER — APIXABAN 2.5 MG/1
1 TABLET, FILM COATED ORAL
Refills: 0 | DISCHARGE

## 2024-06-13 RX ADMIN — PANTOPRAZOLE SODIUM 40 MILLIGRAM(S): 20 TABLET, DELAYED RELEASE ORAL at 05:10

## 2024-06-13 NOTE — DISCHARGE NOTE PROVIDER - PROVIDER TOKENS
PROVIDER:[TOKEN:[[id number]]],FREE:[LAST:[Hematology Oncology],PHONE:[(   )    -],FAX:[(   )    -]]

## 2024-06-13 NOTE — DISCHARGE NOTE PROVIDER - NSDCMRMEDTOKEN_GEN_ALL_CORE_FT
ARIPiprazole 400 mg intramuscular injection, extended release: 400 milligram(s) intramuscularly every 28 days next dose due 6/20/24  cholecalciferol 1250 mcg (50,000 intl units) oral capsule: 1 cap(s) orally once a day  folic acid 1 mg oral tablet: 1 tab(s) orally once a day  nystatin 100,000 units/g topical powder: Apply topically to affected area 2 times a day apply to underneath b/l breast  Protonix 40 mg oral delayed release tablet: 1 tab(s) orally 2 times a day

## 2024-06-13 NOTE — DISCHARGE NOTE PROVIDER - CARE PROVIDERS DIRECT ADDRESSES
[email or web address]

## 2024-06-13 NOTE — DISCHARGE NOTE NURSING/CASE MANAGEMENT/SOCIAL WORK - PATIENT PORTAL LINK FT
You can access the FollowMyHealth Patient Portal offered by Health system by registering at the following website: [email or web address]. By joining [employer]’s FollowMyHealth portal, you will also be able to view your health information using other applications (apps) compatible with our system.

## 2024-06-13 NOTE — DISCHARGE NOTE PROVIDER - NSDCCPCAREPLAN_GEN_ALL_CORE_FT
PRINCIPAL DISCHARGE DIAGNOSIS  Diagnosis: Near syncope  Assessment and Plan of Treatment: likely 2/2 anemia. CTH / cervical without acute findings. EKG without acute ST changes, trop neg x2. TTE ECHO Left ventricular systolic function is normal with an ejection fraction of 61 %. Cardio recs appreciated, likely from anemia.      SECONDARY DISCHARGE DIAGNOSES  Diagnosis: Anemia due to acute blood loss  Assessment and Plan of Treatment: Anemia suspect 2/2 GIB / PUD   s/p 3U prbc  s/p EGD 6/11 with duodenal ulcer present.   GI recommends PPI BID for 12 weeks then daily.   Pt may need repeat EGD in 8 weeks. Needs follow up with GI.    Diagnosis: Schizophrenia  Assessment and Plan of Treatment: Currently admitted to inpatient psych at [place]. On IM 400mg Aripiprazole every 28 days, next dose due 6/20/24    Diagnosis: History of pulmonary embolism  Assessment and Plan of Treatment: Per pt this was diagnosed in [place] >1 year ago, no prior h/o clotting disorder. as pt has standard treatment duration of anticoagulation, CTA chest and LE duplex were done and have been negative here. Discussed with pt and family, Will not resume Eliquis at this time, Pt to follow up with Heme/Onc after DC.    Diagnosis: Chest wall mass  Assessment and Plan of Treatment: Will need biopsy as outpatient if pt agreeable

## 2024-06-13 NOTE — DISCHARGE NOTE PROVIDER - CARE PROVIDER_API CALL
Michelle Terry  Gastroenterology  [address], [address]  [place] [address]  Phone: [phone]  Fax: [phone]  Follow Up Time:     Hematology Oncology,   Phone: (   )    -  Fax: (   )    -  Follow Up Time:

## 2024-06-13 NOTE — DISCHARGE NOTE NURSING/CASE MANAGEMENT/SOCIAL WORK - NSDCFUADDAPPT_GEN_ALL_CORE_FT
[facility] -  [facility]  [address], [place] [address]   - [place] [address]

## 2024-06-13 NOTE — DISCHARGE NOTE PROVIDER - HOSPITAL COURSE
78 year old female w/PMHx PE on eliquis, schizophrenia p/w dizziness and near syncopal episodes, also found to have acute worsening anemia, suspect GIB.   Dizziness, near-syncope - likely 2/2 anemia. CTH / cervical without acute findings. EKG without acute ST changes, trop neg x2. TTE ECHO Left ventricular systolic function is normal with an ejection fraction of 61 %. Cardio recs appreciated, likely from anemia.   Anemia suspect 2/2 GIB / PUD - unclear baseline, s/p 3U prbc. s/p EGD 6/11 with duodenal ulcer present. GI rec PPI BID for 12 weeks then daily. Pt may need repeat EGD in 8 weeks. Needs follow up with GI.   hx of PE - per pt, dx with PE at [place] >1 year ago, no prior h/o clotting disorder. as pt has standard treatment duration of anticoagulation, CTA chest and LE duplex were done and have been negative here. Discussed with pt and family, Will not resume Eliquis at this time, Pt to follow up with Heme/Onc after DC.   h/o schizophrenia - Currently admitted to inpatient psych at [place]. On IM 400mg Aripiprazole every 28 days, next dose due 6/20/24

## 2024-06-13 NOTE — DISCHARGE NOTE NURSING/CASE MANAGEMENT/SOCIAL WORK - NSDCPEFALRISK_GEN_ALL_CORE
For information on Fall & Injury Prevention, visit: [email or web address] OR  [email or web address] OR  https://www.cdc.gov/steadi/patient.html

## 2024-06-14 PROBLEM — Z00.00 ENCOUNTER FOR PREVENTIVE HEALTH EXAMINATION: Status: ACTIVE | Noted: 2024-06-14

## 2024-06-15 PROBLEM — I26.99 OTHER PULMONARY EMBOLISM WITHOUT ACUTE COR PULMONALE: Chronic | Status: ACTIVE | Noted: 2024-06-08

## 2024-06-15 PROBLEM — F20.9 SCHIZOPHRENIA, UNSPECIFIED: Chronic | Status: ACTIVE | Noted: 2024-06-09

## 2024-06-19 ENCOUNTER — APPOINTMENT (OUTPATIENT)
Dept: GASTROENTEROLOGY | Facility: CLINIC | Age: 79
End: 2024-06-19

## 2024-06-19 ENCOUNTER — APPOINTMENT (OUTPATIENT)
Dept: GASTROENTEROLOGY | Facility: CLINIC | Age: 79
End: 2024-06-19
Payer: MEDICARE

## 2024-06-19 VITALS
BODY MASS INDEX: 26.92 KG/M2 | WEIGHT: 188 LBS | OXYGEN SATURATION: 98 % | SYSTOLIC BLOOD PRESSURE: 141 MMHG | RESPIRATION RATE: 14 BRPM | DIASTOLIC BLOOD PRESSURE: 78 MMHG | HEIGHT: 70 IN | HEART RATE: 80 BPM

## 2024-06-19 DIAGNOSIS — R46.89 OTHER SYMPTOMS AND SIGNS INVOLVING APPEARANCE AND BEHAVIOR: ICD-10-CM

## 2024-06-19 DIAGNOSIS — Z78.9 OTHER SPECIFIED HEALTH STATUS: ICD-10-CM

## 2024-06-19 DIAGNOSIS — R44.0 AUDITORY HALLUCINATIONS: ICD-10-CM

## 2024-06-19 DIAGNOSIS — Z86.59 PERSONAL HISTORY OF OTHER MENTAL AND BEHAVIORAL DISORDERS: ICD-10-CM

## 2024-06-19 PROCEDURE — 99214 OFFICE O/P EST MOD 30 MIN: CPT

## 2024-06-19 RX ORDER — NYSTATIN 100000 [USP'U]/ML
100000 SUSPENSION ORAL
Refills: 0 | Status: ACTIVE | COMMUNITY

## 2024-06-19 RX ORDER — PANTOPRAZOLE 40 MG/1
40 TABLET, DELAYED RELEASE ORAL
Refills: 0 | Status: ACTIVE | COMMUNITY

## 2024-06-19 RX ORDER — ARIPIPRAZOLE 5 MG/1
5 TABLET ORAL
Refills: 0 | Status: ACTIVE | COMMUNITY

## 2024-06-19 NOTE — HISTORY OF PRESENT ILLNESS
[FreeTextEntry1] : Patient is a 78 year old female, with PMH of PE was on Eliquis (no longer on AC), schizophrenia living at [place], who presents for hospital follow up.   Pt was admitted to [facility] last week after an episode of dizziness and near syncope, found to have anemia. EGD showed duodenal ulcer, non obstructing esophageal ring in distal esophagus. No biopsy done due to acuity. Pt was advised she can restart Eliquis from a GI perspective but has not been restarted by hematology since PE was >1 year ago. She is taking Pantoprazole 40mg BID, admits to increased "gas" but no diarrhea.   Patient denies any significant cardiac or pulmonary conditions.   Patient denies pyrosis, dysphagia, abdominal pain, change in BMs, rectal bleeding, nausea, vomiting, or unexplained weight loss.  [de-identified] : EGD 6/2024 showed duodenal ulcer, non obstructing esophageal ring in distal esophagus. No biopsy done due to acuity.

## 2024-06-19 NOTE — ASSESSMENT
[FreeTextEntry1] : Patient is a 78 year old female, with PMH of PE was on Eliquis (no longer on AC), schizophrenia living at [place], who presents for hospital follow up.   Pt was admitted to [facility] last week after an episode of dizziness and near syncope, found to have anemia. EGD showed duodenal ulcer, non obstructing esophageal ring in distal esophagus. No biopsy done due to acuity. Pt was advised she can restart Eliquis from a GI perspective but has not been restarted by hematology since PE was >1 year ago. She is taking Pantoprazole 40mg BID, admits to increased "gas" but no diarrhea.   Continue Pantoprazole 40mg BID x 12 weeks Will need eventual EGD to ensure resolution along with biopsy since path not done during previous EGD RTC in 3 months  Not currently on Eliquis, might be able to d/c PPI pending results of EGD

## 2024-06-19 NOTE — PHYSICAL EXAM
[Alert] : alert [Normal Voice/Communication] : normal voice/communication [Healthy Appearing] : healthy appearing [No Acute Distress] : no acute distress [Sclera] : the sclera and conjunctiva were normal [Hearing Threshold Finger Rub Not Payne] : hearing was normal [Normal Lips/Gums] : the lips and gums were normal [Normal Appearance] : the appearance of the neck was normal [No Respiratory Distress] : no respiratory distress [No Acc Muscle Use] : no accessory muscle use [Respiration, Rhythm And Depth] : normal respiratory rhythm and effort [Auscultation Breath Sounds / Voice Sounds] : lungs were clear to auscultation bilaterally [Heart Rate And Rhythm] : heart rate was normal and rhythm regular [Normal S1, S2] : normal S1 and S2 [Bowel Sounds] : normal bowel sounds [Abdomen Tenderness] : non-tender [No Masses] : no abdominal mass palpated [Abdomen Soft] : soft [] : no hepatosplenomegaly [Oriented To Time, Place, And Person] : oriented to person, place, and time

## 2024-06-24 ENCOUNTER — OUTPATIENT (OUTPATIENT)
Dept: OUTPATIENT SERVICES | Facility: HOSPITAL | Age: 79
LOS: 1 days | Discharge: HOME | End: 2024-06-24

## 2024-06-24 DIAGNOSIS — D64.9 ANEMIA, UNSPECIFIED: ICD-10-CM

## 2024-07-01 ENCOUNTER — APPOINTMENT (OUTPATIENT)
Dept: HEMATOLOGY ONCOLOGY | Facility: CLINIC | Age: 79
End: 2024-07-01
Payer: MEDICARE

## 2024-07-01 ENCOUNTER — RESULT REVIEW (OUTPATIENT)
Age: 79
End: 2024-07-01

## 2024-07-01 VITALS
DIASTOLIC BLOOD PRESSURE: 82 MMHG | HEART RATE: 74 BPM | SYSTOLIC BLOOD PRESSURE: 145 MMHG | OXYGEN SATURATION: 97 % | BODY MASS INDEX: 26.34 KG/M2 | HEIGHT: 70 IN | WEIGHT: 184.03 LBS

## 2024-07-01 DIAGNOSIS — K26.9 DUODENAL ULCER, UNSPECIFIED AS ACUTE OR CHRONIC, W/OUT HEMORRHAGE OR PERFORATION: ICD-10-CM

## 2024-07-01 DIAGNOSIS — I26.99 OTHER PULMONARY EMBOLISM W/OUT ACUTE COR PULMONALE: ICD-10-CM

## 2024-07-01 DIAGNOSIS — D64.9 ANEMIA, UNSPECIFIED: ICD-10-CM

## 2024-07-01 LAB
BASOPHILS # BLD AUTO: 0 K/UL — SIGNIFICANT CHANGE UP (ref 0–0.2)
BASOPHILS NFR BLD AUTO: 1 % — SIGNIFICANT CHANGE UP (ref 0–2)
EOSINOPHIL # BLD AUTO: 0.1 K/UL — SIGNIFICANT CHANGE UP (ref 0–0.5)
EOSINOPHIL NFR BLD AUTO: 3 % — SIGNIFICANT CHANGE UP (ref 0–6)
HCT VFR BLD CALC: 34.5 % — SIGNIFICANT CHANGE UP (ref 34.5–45)
HGB BLD-MCNC: 11.1 G/DL — LOW (ref 11.5–15.5)
LYMPHOCYTES # BLD AUTO: 1.1 K/UL — SIGNIFICANT CHANGE UP (ref 1–3.3)
LYMPHOCYTES # BLD AUTO: 22.4 % — SIGNIFICANT CHANGE UP (ref 13–44)
MCHC RBC-ENTMCNC: 32 PG — SIGNIFICANT CHANGE UP (ref 27–34)
MCHC RBC-ENTMCNC: 32.2 G/DL — SIGNIFICANT CHANGE UP (ref 32–36)
MCV RBC AUTO: 99.4 FL — SIGNIFICANT CHANGE UP (ref 80–100)
MONOCYTES # BLD AUTO: 0.5 K/UL — SIGNIFICANT CHANGE UP (ref 0–0.9)
MONOCYTES NFR BLD AUTO: 9.7 % — SIGNIFICANT CHANGE UP (ref 2–14)
NEUTROPHILS # BLD AUTO: 3.1 K/UL — SIGNIFICANT CHANGE UP (ref 1.8–7.4)
NEUTROPHILS NFR BLD AUTO: 63.9 % — SIGNIFICANT CHANGE UP (ref 43–77)
PLATELET # BLD AUTO: 265 K/UL — SIGNIFICANT CHANGE UP (ref 150–400)
RBC # BLD: 3.47 M/UL — LOW (ref 3.8–5.2)
RBC # FLD: 13.8 % — SIGNIFICANT CHANGE UP (ref 10.3–14.5)
WBC # BLD: 4.8 K/UL — SIGNIFICANT CHANGE UP (ref 3.8–10.5)
WBC # FLD AUTO: 4.8 K/UL — SIGNIFICANT CHANGE UP (ref 3.8–10.5)

## 2024-07-01 PROCEDURE — 99213 OFFICE O/P EST LOW 20 MIN: CPT

## 2024-07-03 PROBLEM — I26.99 OTHER PULMONARY EMBOLISM WITHOUT ACUTE COR PULMONALE, UNSPECIFIED CHRONICITY: Status: RESOLVED | Noted: 2024-07-03 | Resolved: 2024-07-03

## 2024-08-22 ENCOUNTER — APPOINTMENT (OUTPATIENT)
Dept: DERMATOLOGY | Facility: CLINIC | Age: 79
End: 2024-08-22
Payer: MEDICARE

## 2024-08-22 PROCEDURE — 99203 OFFICE O/P NEW LOW 30 MIN: CPT

## 2024-09-09 ENCOUNTER — APPOINTMENT (OUTPATIENT)
Dept: GASTROENTEROLOGY | Facility: CLINIC | Age: 79
End: 2024-09-09
Payer: MEDICARE

## 2024-09-09 VITALS
WEIGHT: 175 LBS | RESPIRATION RATE: 14 BRPM | SYSTOLIC BLOOD PRESSURE: 138 MMHG | OXYGEN SATURATION: 97 % | HEIGHT: 70 IN | DIASTOLIC BLOOD PRESSURE: 67 MMHG | HEART RATE: 81 BPM | BODY MASS INDEX: 25.05 KG/M2 | TEMPERATURE: 97.4 F

## 2024-09-09 DIAGNOSIS — D64.9 ANEMIA, UNSPECIFIED: ICD-10-CM

## 2024-09-09 DIAGNOSIS — K26.9 DUODENAL ULCER, UNSPECIFIED AS ACUTE OR CHRONIC, W/OUT HEMORRHAGE OR PERFORATION: ICD-10-CM

## 2024-09-09 DIAGNOSIS — Z09 ENCOUNTER FOR FOLLOW-UP EXAMINATION AFTER COMPLETED TREATMENT FOR CONDITIONS OTHER THAN MALIGNANT NEOPLASM: ICD-10-CM

## 2024-09-09 PROCEDURE — 99213 OFFICE O/P EST LOW 20 MIN: CPT

## 2024-09-09 NOTE — HISTORY OF PRESENT ILLNESS
[FreeTextEntry1] : Patient is a 79 year old female, with PMH of PE was previously on Eliquis (no longer on AC), schizophrenia living at [place], who presents for hospital follow up.   Pt was admitted to [facility] in June 2024, after an episode of dizziness and near syncope, found to have anemia Hgb down to 6gm. She received 3u pRBC and had EGD on 6/11/24 which showed duodenal ulcer, non obstructing esophageal ring in distal esophagus. No biopsy done due to acuity. Pt was advised she can restart Eliquis from a GI perspective, if needed but pt has chosen to hold off on restarting Eliquis. She has since been seen by hematology who is closely monitoring her since the pt and her family wish not to restart Eliquis. She is taking Pantoprazole 40mg BID.   Patient denies any significant cardiac or pulmonary conditions.   Patient denies pyrosis, dysphagia, abdominal pain, change in BMs, rectal bleeding, nausea, vomiting, or unexplained weight loss.  [de-identified] : EGD 6/2024 showed duodenal ulcer, non obstructing esophageal ring in distal esophagus. No biopsy done due to acuity.

## 2024-09-09 NOTE — ASSESSMENT
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   Admitted to [facility] in June 2024, anemia Hgb 6gm and is s/p 3u pRBCs and EGD 6/11/24 with Dr. Rosales showed +duodenal ulcer, non obstructing esophageal ring in distal esophagus. No biopsy done due to acuity.   Pt has not restarted Eliquis per her and her family's request, ok per hematology.   Most recent Hgb on 7/1/24 was 11.1g  Will repeat labs and plan for EGD to be scheduled to r/o gastritis, esophagitis, Simms's Esophagus, or PUD. Indication, risks and benefit of EGD discussed with patient in detail. All questions answered. Patient is medically optimized for EGD. Procedure to be done at [facility]

## 2024-09-09 NOTE — PHYSICAL EXAM
[Alert] : alert [Normal Voice/Communication] : normal voice/communication [Healthy Appearing] : healthy appearing [No Acute Distress] : no acute distress [Sclera] : the sclera and conjunctiva were normal [Hearing Threshold Finger Rub Not [place]] : hearing was normal [Normal Lips/Gums] : the lips and gums were normal [Normal Appearance] : the appearance of the neck was normal [No Neck Mass] : no neck mass was observed [No Respiratory Distress] : no respiratory distress [No Acc Muscle Use] : no accessory muscle use [Respiration, Rhythm And Depth] : normal respiratory rhythm and effort [Auscultation Breath Sounds / Voice Sounds] : lungs were clear to auscultation bilaterally [Heart Rate And Rhythm] : heart rate was normal and rhythm regular [Normal S1, S2] : normal S1 and S2 [Bowel Sounds] : normal bowel sounds [Abdomen Tenderness] : non-tender [No Masses] : no abdominal mass palpated [Abdomen Soft] : soft [] : no hepatosplenomegaly [Oriented To Time, Place, And Person] : oriented to person, place, and time

## 2024-09-09 NOTE — REASON FOR VISIT
[Follow-up] : a follow-up of an existing diagnosis [Other: _____] : [unfilled] [FreeTextEntry1] : duodenal ulcer

## 2024-09-19 LAB
ALBUMIN SERPL ELPH-MCNC: 4.1 G/DL
ALP BLD-CCNC: 77 U/L
ALT SERPL-CCNC: 7 U/L
ANION GAP SERPL CALC-SCNC: 9 MMOL/L
AST SERPL-CCNC: 16 U/L
BASOPHILS # BLD AUTO: 0.03 K/UL
BASOPHILS NFR BLD AUTO: 0.6 %
BILIRUB SERPL-MCNC: 0.4 MG/DL
BUN SERPL-MCNC: 20 MG/DL
CALCIUM SERPL-MCNC: 9.6 MG/DL
CHLORIDE SERPL-SCNC: 104 MMOL/L
CO2 SERPL-SCNC: 29 MMOL/L
CREAT SERPL-MCNC: 1 MG/DL
EGFR: 58 ML/MIN/1.73M2
EOSINOPHIL # BLD AUTO: 0.15 K/UL
EOSINOPHIL NFR BLD AUTO: 3.1 %
GLUCOSE SERPL-MCNC: 96 MG/DL
HCT VFR BLD CALC: 41.8 %
HGB BLD-MCNC: 12.9 G/DL
IMM GRANULOCYTES NFR BLD AUTO: 0.2 %
INR PPP: 1.04 RATIO
LYMPHOCYTES # BLD AUTO: 1.09 K/UL
LYMPHOCYTES NFR BLD AUTO: 22.4 %
MAN DIFF?: NORMAL
MCHC RBC-ENTMCNC: 29.5 PG
MCHC RBC-ENTMCNC: 30.9 GM/DL
MCV RBC AUTO: 95.4 FL
MONOCYTES # BLD AUTO: 0.44 K/UL
MONOCYTES NFR BLD AUTO: 9.1 %
NEUTROPHILS # BLD AUTO: 3.14 K/UL
NEUTROPHILS NFR BLD AUTO: 64.6 %
PLATELET # BLD AUTO: 267 K/UL
POTASSIUM SERPL-SCNC: 4.2 MMOL/L
PROT SERPL-MCNC: 6.7 G/DL
PT BLD: 11.7 SEC
RBC # BLD: 4.38 M/UL
RBC # FLD: 14.4 %
SODIUM SERPL-SCNC: 141 MMOL/L
WBC # FLD AUTO: 4.86 K/UL

## 2024-10-02 ENCOUNTER — OUTPATIENT (OUTPATIENT)
Dept: OUTPATIENT SERVICES | Facility: HOSPITAL | Age: 79
LOS: 1 days | End: 2024-10-02
Payer: MEDICARE

## 2024-10-02 ENCOUNTER — TRANSCRIPTION ENCOUNTER (OUTPATIENT)
Age: 79
End: 2024-10-02

## 2024-10-02 ENCOUNTER — APPOINTMENT (OUTPATIENT)
Dept: GASTROENTEROLOGY | Facility: HOSPITAL | Age: 79
End: 2024-10-02

## 2024-10-02 DIAGNOSIS — K26.9 DUODENAL ULCER, UNSPECIFIED AS ACUTE OR CHRONIC, W/OUT HEMORRHAGE OR PERFORATION: ICD-10-CM

## 2024-10-02 DIAGNOSIS — K26.9 DUODENAL ULCER, UNSPECIFIED AS ACUTE OR CHRONIC, WITHOUT HEMORRHAGE OR PERFORATION: ICD-10-CM

## 2024-10-02 DIAGNOSIS — D64.9 ANEMIA, UNSPECIFIED: ICD-10-CM

## 2024-10-02 PROCEDURE — 43235 EGD DIAGNOSTIC BRUSH WASH: CPT | Mod: 53

## 2024-10-02 NOTE — ASSESSMENT
[FreeTextEntry1] : Procedure risks and benefits reviewed with patient See anesthesia note for asa and mallamapti score

## 2024-11-20 PROCEDURE — 43239 EGD BIOPSY SINGLE/MULTIPLE: CPT | Mod: 74

## 2024-12-09 NOTE — BH CONSULTATION LIAISON ASSESSMENT NOTE - ABORTED (SELF-INTERRUPTED) ATTEMPT:
[Home] : at home, [unfilled] , at the time of the visit. [Medical Office: ([facility])___] : at the medical office located in  [Spouse] : spouse [Verbal consent obtained from patient] : the patient, [unfilled] None known

## (undated) DEVICE — KIT DEFENDO 4 OLY 4 PC

## (undated) DEVICE — TUBING IV EXTENSION MACRO W CLAVE 7"

## (undated) DEVICE — VENODYNE/SCD SLEEVE CALF MEDIUM

## (undated) DEVICE — DRSG CURITY GAUZE SPONGE 4 X 4" 12-PLY NON-STERILE

## (undated) DEVICE — BITE BLOCK ADULT 20 X 27MM (GREEN)

## (undated) DEVICE — SYR IV FLUSH SALINE 10ML 30/TY

## (undated) DEVICE — GOWN IMPERV XL

## (undated) DEVICE — SYR LUER SLIP TIP 50CC

## (undated) DEVICE — TUBING ALARIS PUMP MODULE NON-DEHP

## (undated) DEVICE — MASK PROCEED EARLOOP LVL 2 50/BX

## (undated) DEVICE — UNDERPAD LINEN SAVER 23 X 36"

## (undated) DEVICE — SENSOR O2 FINGER ADULT

## (undated) DEVICE — SOL BAG NS 0.9% 1000ML

## (undated) DEVICE — DENTURE CUP PINK

## (undated) DEVICE — FORCEP RADIAL JAW 4 W NDL 2.4MM 2.8MM 240CM ORANGE DISP

## (undated) DEVICE — SOL IRR BAG H2O 1000ML

## (undated) DEVICE — PACK IV START WITH CHG

## (undated) DEVICE — WARMING BLANKET FULL ADULT

## (undated) DEVICE — DRSG 2X2

## (undated) DEVICE — SYR SLIP 10CC

## (undated) DEVICE — SOL IRR BAG NS 0.9% 1000ML

## (undated) DEVICE — CATH IV SAFE BC 22G X 1" (BLUE)

## (undated) DEVICE — MASK PROCED EARLOOP 50/BX LRC COVID ADD